# Patient Record
Sex: FEMALE | Race: WHITE
[De-identification: names, ages, dates, MRNs, and addresses within clinical notes are randomized per-mention and may not be internally consistent; named-entity substitution may affect disease eponyms.]

---

## 2017-09-05 ENCOUNTER — HOSPITAL ENCOUNTER (EMERGENCY)
Dept: HOSPITAL 23 - SED | Age: 27
Discharge: HOME | End: 2017-09-05
Payer: COMMERCIAL

## 2017-09-05 VITALS — BODY MASS INDEX: 32.61 KG/M2 | WEIGHT: 190.99 LBS | HEIGHT: 64 IN

## 2017-09-05 DIAGNOSIS — Z88.8: ICD-10-CM

## 2017-09-05 DIAGNOSIS — J45.909: ICD-10-CM

## 2017-09-05 DIAGNOSIS — T81.4XXA: Primary | ICD-10-CM

## 2017-09-05 DIAGNOSIS — F41.9: ICD-10-CM

## 2017-09-05 DIAGNOSIS — N72: ICD-10-CM

## 2017-09-05 LAB
BASOPHIL#: 0.1 X10E3 (ref 0–0.3)
BASOPHIL%: 0.8 % (ref 0–2.5)
BLOOD UREA NITROGEN: 15 MG/DL (ref 9–23)
BUN/CREATININE RATIO: 25
CALCIUM SERUM: 9.1 MG/DL (ref 8.4–10.2)
CK MB SERPL-RTO: 13.3 % (ref 11–15.5)
CK MB SERPL-RTO: 33.7 G/DL (ref 30–36)
CREATININE SERUM: 0.6 MG/DL (ref 0.6–1.4)
DIFF IND: NO
EOSINOPHIL#: 0.1 X10E3 (ref 0–0.7)
EOSINOPHIL%: 2.1 % (ref 0–7)
GLOM FILT RATE ESTIMATED: 124 ML/MIN (ref 60–?)
GLUCOSE FASTING: 91 MG/DL (ref 70–110)
HEMATOCRIT: 41 % (ref 35–45)
HEMOGLOBIN: 13.8 GM/DL (ref 12–16)
KETONES UR QL: 107 MMOL/L (ref 100–111)
KETONES UR QL: 24 MMOL/L (ref 22–31)
LYMPHOCYTE#: 2.5 X10E3 (ref 1–3.5)
LYMPHOCYTE%: 35.4 % (ref 17–45)
MEAN CELL VOLUME: 92.6 FL (ref 83–96)
MEAN CORPUSCULAR HEMOGLOBIN: 31.3 PG (ref 28–34)
MEAN PLATELET VOLUME: 11.1 FL (ref 6.5–11.5)
MICRO INDICATED?: NO
MONOCYTE#: 0.6 X10E3 (ref 0–1)
MONOCYTE%: 8.9 % (ref 3–12)
NEUTROPHIL#: 3.8 X10E3 (ref 1.5–7.1)
NEUTROPHIL%: 52.8 % (ref 40–75)
PLATELET COUNT: 156 X10E3 (ref 140–420)
POTASSIUM: 3.9 MMOL/L (ref 3.5–5.1)
RED BLOOD COUNT: 4.42 X10E (ref 3.9–5.3)
SODIUM: 138 MMOL/L (ref 135–145)
URINE APPEARANCE: CLEAR
URINE BILIRUBIN: (no result)
URINE BLOOD: (no result)
URINE COLOR: YELLOW
URINE GLUCOSE: (no result) MG/DL
URINE KETONE: (no result)
URINE LEUKOCYTE ESTERASE: (no result)
URINE NITRATE: (no result)
URINE PH: 7 (ref 5–8)
URINE PROTEIN: (no result)
URINE SOURCE: (no result)
URINE SPECIFIC GRAVITY: 1.01 (ref 1–1.03)
URINE UROBILINOGEN: 0.2 MG/DL
WHITE BLOOD COUNT: 7.1 X10E3 (ref 4–10.5)

## 2024-10-18 ENCOUNTER — APPOINTMENT (OUTPATIENT)
Dept: GENERAL RADIOLOGY | Facility: HOSPITAL | Age: 34
End: 2024-10-18
Payer: COMMERCIAL

## 2024-10-18 ENCOUNTER — HOSPITAL ENCOUNTER (EMERGENCY)
Facility: HOSPITAL | Age: 34
Discharge: HOME | End: 2024-10-19
Attending: EMERGENCY MEDICINE
Payer: COMMERCIAL

## 2024-10-18 VITALS
HEIGHT: 64 IN | OXYGEN SATURATION: 100 % | RESPIRATION RATE: 16 BRPM | DIASTOLIC BLOOD PRESSURE: 102 MMHG | SYSTOLIC BLOOD PRESSURE: 136 MMHG | WEIGHT: 235 LBS | TEMPERATURE: 98.6 F | BODY MASS INDEX: 40.12 KG/M2 | HEART RATE: 76 BPM

## 2024-10-18 DIAGNOSIS — M75.22 BICEPS TENDINITIS OF LEFT UPPER EXTREMITY: Primary | ICD-10-CM

## 2024-10-18 PROCEDURE — 72050 X-RAY EXAM NECK SPINE 4/5VWS: CPT

## 2024-10-18 PROCEDURE — 99283 EMERGENCY DEPT VISIT LOW MDM: CPT

## 2024-10-18 PROCEDURE — 73030 X-RAY EXAM OF SHOULDER: CPT

## 2024-10-18 PROCEDURE — 25010000002 KETOROLAC TROMETHAMINE PER 15 MG: Performed by: EMERGENCY MEDICINE

## 2024-10-18 PROCEDURE — 96372 THER/PROPH/DIAG INJ SC/IM: CPT

## 2024-10-18 PROCEDURE — 93005 ELECTROCARDIOGRAM TRACING: CPT | Performed by: EMERGENCY MEDICINE

## 2024-10-18 RX ORDER — IBUPROFEN 400 MG/1
800 TABLET, FILM COATED ORAL ONCE
Status: DISCONTINUED | OUTPATIENT
Start: 2024-10-18 | End: 2024-10-18

## 2024-10-18 RX ORDER — KETOROLAC TROMETHAMINE 30 MG/ML
60 INJECTION, SOLUTION INTRAMUSCULAR; INTRAVENOUS ONCE
Status: COMPLETED | OUTPATIENT
Start: 2024-10-18 | End: 2024-10-18

## 2024-10-18 RX ADMIN — KETOROLAC TROMETHAMINE 60 MG: 30 INJECTION, SOLUTION INTRAMUSCULAR; INTRAVENOUS at 23:30

## 2024-10-19 LAB
QT INTERVAL: 376 MS
QTC INTERVAL: 429 MS

## 2024-10-19 PROCEDURE — 99283 EMERGENCY DEPT VISIT LOW MDM: CPT | Performed by: EMERGENCY MEDICINE

## 2024-10-19 NOTE — FSED PROVIDER NOTE
"Subjective   History of Present Illness  35yo female presents ED c/o 2wk hx intermittent left shoulder/proximal arm pain characterized as \"sharp\"/exacerbated movement/occasional radiation left hand.  ROS neg neck pain/trauma/chest pain/soa/paresthesia/motor weakness.    History provided by:  Patient  Shoulder Injury      Review of Systems   Constitutional: Negative.    HENT: Negative.     Eyes: Negative.    Respiratory: Negative.     Cardiovascular: Negative.    Gastrointestinal: Negative.    Musculoskeletal:  Positive for arthralgias.   Allergic/Immunologic: Negative for immunocompromised state.   Neurological:  Negative for weakness and numbness.   All other systems reviewed and are negative.      Past Medical History:   Diagnosis Date    Anxiety     Asthma     Depression     GERD (gastroesophageal reflux disease)     Hidradenitis        Allergies   Allergen Reactions    Lorazepam Anxiety     pt states makes me feel more depressedstates emotional reaction    pt states it makes her more depressed feeling    Tilactase Swelling    Zolpidem Unknown - High Severity and Headache     Makes me extremely depressed.     Outside Source Comment: Makes me extremely depressed.    Makes me extremely depressed.    Bee Venom Unknown - High Severity    Doxycycline Unknown - High Severity     pt states she told her doctor she did not want to take, that her mother won't take due to pseudotumors and he listed it as an allergystates emotional reaction    Lactose Other (See Comments) and Unknown (See Comments)    Prednisone Anxiety       History reviewed. No pertinent surgical history.    History reviewed. No pertinent family history.    Social History     Socioeconomic History    Marital status: Single   Tobacco Use    Smoking status: Never    Smokeless tobacco: Never   Vaping Use    Vaping status: Never Used   Substance and Sexual Activity    Alcohol use: Never           Objective   Physical Exam  Vitals and nursing note reviewed. "   Constitutional:       Appearance: Normal appearance.   HENT:      Head: Normocephalic and atraumatic.      Right Ear: External ear normal.      Left Ear: External ear normal.      Nose: Nose normal.      Mouth/Throat:      Mouth: Mucous membranes are moist.      Pharynx: Oropharynx is clear.   Eyes:      Pupils: Pupils are equal, round, and reactive to light.   Cardiovascular:      Rate and Rhythm: Normal rate and regular rhythm.      Pulses: Normal pulses.      Heart sounds: Normal heart sounds. No murmur heard.     No friction rub. No gallop.   Pulmonary:      Effort: Pulmonary effort is normal. No respiratory distress.      Breath sounds: Normal breath sounds. No wheezing, rhonchi or rales.   Abdominal:      General: Bowel sounds are normal. There is no distension.      Palpations: Abdomen is soft.      Tenderness: There is no abdominal tenderness.   Musculoskeletal:      Left shoulder: Tenderness present. No swelling, deformity or bony tenderness. Normal range of motion. Normal strength. Normal pulse.      Left upper arm: Normal.      Left elbow: Normal.        Arms:       Cervical back: Normal, full passive range of motion without pain, normal range of motion and neck supple. No rigidity or tenderness. No spinous process tenderness or muscular tenderness.   Lymphadenopathy:      Cervical: No cervical adenopathy.   Skin:     General: Skin is warm and dry.   Neurological:      General: No focal deficit present.      Mental Status: She is alert and oriented to person, place, and time.      GCS: GCS eye subscore is 4. GCS verbal subscore is 5. GCS motor subscore is 6.      Sensory: Sensation is intact.      Motor: Motor function is intact.      Deep Tendon Reflexes:      Reflex Scores:       Bicep reflexes are 2+ on the left side.        ECG 12 Lead      Date/Time: 10/19/2024 12:37 AM    Performed by: Jefferson Castro MD  Authorized by: Jefferson Castro MD  Interpreted by ED physician  Rhythm: sinus rhythm  Rate:  normal  BPM: 78  QRS axis: normal  Conduction: conduction normal  ST Segments: ST segments normal  T Waves: T waves normal  Other findings: PRWP  Clinical impression: non-specific ECG               ED Course      XR Spine Cervical Complete 4 or 5 View    Result Date: 10/19/2024  Narrative: Patient: FAVIAN KAY  Time Out: 00:23 Exam(s): XR C SPINE EXAM:   XR Cervical Spine, 7 Views CLINICAL HISTORY:    Reason for exam: shoulder pain. TECHNIQUE:   7 views of the cervical spine. COMPARISON:   No relevant prior studies available. FINDINGS:   Vertebrae:  Unremarkable.  No definite fracture.  Normal alignment.   Disc spaces:  No acute findings.  No neuroforaminal narrowing.   Soft tissues:  Unremarkable.  No prevertebral soft tissue swelling. IMPRESSION:     Unremarkable cervical spine x-rays.     Impression: Electronically signed by Pb Aguilar MD on 10-19-24 at 0023    XR Shoulder 2+ View Left    Result Date: 10/19/2024  Narrative: Patient: FAVIAN KAY  Time Out: 00:22 Exam(s): XR LEFT SHOULDER EXAM:   XR Left Shoulder Complete, 2 or More Views CLINICAL HISTORY:    Reason for exam: shoulder pain. TECHNIQUE:   Two or more views of the left shoulder. COMPARISON:   No relevant prior studies available. FINDINGS:   Bones joints:  Unremarkable.  No acute fracture.  No dislocation.   Soft tissues:  Unremarkable. IMPRESSION:     Unremarkable left shoulder x-rays.     Impression: Electronically signed by Pb Aguilar MD on 10-19-24 at 0022    Imaging Scanned Result    Result Date: 9/24/2024  Narrative: This result has an attachment that is not available. Ordered by an unspecified provider.                                        Medical Decision Making      Final diagnoses:   Biceps tendinitis of left upper extremity       ED Disposition  ED Disposition       ED Disposition   Discharge    Condition   Good    Comment   --               Ning Silva MD  [address] [place] [id number]  [phone]    Schedule an  appointment as soon as possible for a visit       Hi Guzman MD  [address] [facility] [address]  [place] [id number] [phone]    In 3 days           Medication List        New Prescriptions      diclofenac 50 MG EC tablet  Commonly known as: VOLTAREN  Take 1 tablet by mouth 3 (Three) Times a Day As Needed (pain).               Where to Get Your Medications        These medications [id number] - [place], [place] - [address] [facility] AT [facility] - [phone]  - [phone] [address], [facility] [address]      Phone: [phone]   diclofenac 50 MG EC tablet

## 2025-02-18 ENCOUNTER — TELEPHONE (OUTPATIENT)
Dept: URGENT CARE | Facility: CLINIC | Age: 35
End: 2025-02-18
Payer: COMMERCIAL

## 2025-02-18 DIAGNOSIS — J02.0 STREP THROAT: Primary | ICD-10-CM

## 2025-02-18 RX ORDER — AMOXICILLIN 500 MG/1
1000 CAPSULE ORAL 2 TIMES DAILY
Qty: 40 CAPSULE | Refills: 0 | Status: SHIPPED | OUTPATIENT
Start: 2025-02-18 | End: 2025-02-28

## 2025-05-07 ENCOUNTER — HOSPITAL ENCOUNTER (OUTPATIENT)
Facility: HOSPITAL | Age: 35
Setting detail: OBSERVATION
Discharge: HOME | End: 2025-05-08
Attending: EMERGENCY MEDICINE | Admitting: EMERGENCY MEDICINE
Payer: COMMERCIAL

## 2025-05-07 ENCOUNTER — APPOINTMENT (OUTPATIENT)
Dept: GENERAL RADIOLOGY | Facility: HOSPITAL | Age: 35
End: 2025-05-07
Payer: COMMERCIAL

## 2025-05-07 DIAGNOSIS — R00.2 PALPITATIONS: ICD-10-CM

## 2025-05-07 DIAGNOSIS — R07.89 ATYPICAL CHEST PAIN: ICD-10-CM

## 2025-05-07 DIAGNOSIS — R42 DIZZINESS AND GIDDINESS: ICD-10-CM

## 2025-05-07 DIAGNOSIS — R42 EPISODIC LIGHTHEADEDNESS: Primary | ICD-10-CM

## 2025-05-07 PROCEDURE — 99285 EMERGENCY DEPT VISIT HI MDM: CPT

## 2025-05-07 PROCEDURE — 84439 ASSAY OF FREE THYROXINE: CPT | Performed by: PHYSICIAN ASSISTANT

## 2025-05-07 PROCEDURE — 93005 ELECTROCARDIOGRAM TRACING: CPT

## 2025-05-07 PROCEDURE — 93005 ELECTROCARDIOGRAM TRACING: CPT | Performed by: EMERGENCY MEDICINE

## 2025-05-07 PROCEDURE — 84443 ASSAY THYROID STIM HORMONE: CPT | Performed by: PHYSICIAN ASSISTANT

## 2025-05-07 PROCEDURE — 84484 ASSAY OF TROPONIN QUANT: CPT | Performed by: EMERGENCY MEDICINE

## 2025-05-07 PROCEDURE — 85379 FIBRIN DEGRADATION QUANT: CPT | Performed by: EMERGENCY MEDICINE

## 2025-05-07 PROCEDURE — 71045 X-RAY EXAM CHEST 1 VIEW: CPT

## 2025-05-07 PROCEDURE — 80053 COMPREHEN METABOLIC PANEL: CPT | Performed by: EMERGENCY MEDICINE

## 2025-05-07 RX ORDER — SODIUM CHLORIDE 0.9 % (FLUSH) 0.9 %
10 SYRINGE (ML) INJECTION AS NEEDED
Status: DISCONTINUED | OUTPATIENT
Start: 2025-05-07 | End: 2025-05-08 | Stop reason: HOSPADM

## 2025-05-07 RX ORDER — ASPIRIN 81 MG/1
324 TABLET, CHEWABLE ORAL ONCE
Status: COMPLETED | OUTPATIENT
Start: 2025-05-07 | End: 2025-05-08

## 2025-05-08 ENCOUNTER — APPOINTMENT (OUTPATIENT)
Dept: CARDIOLOGY | Facility: HOSPITAL | Age: 35
End: 2025-05-08
Payer: COMMERCIAL

## 2025-05-08 ENCOUNTER — APPOINTMENT (OUTPATIENT)
Dept: CT IMAGING | Facility: HOSPITAL | Age: 35
End: 2025-05-08
Payer: COMMERCIAL

## 2025-05-08 ENCOUNTER — APPOINTMENT (OUTPATIENT)
Dept: MRI IMAGING | Facility: HOSPITAL | Age: 35
End: 2025-05-08
Payer: COMMERCIAL

## 2025-05-08 VITALS
HEART RATE: 74 BPM | DIASTOLIC BLOOD PRESSURE: 74 MMHG | WEIGHT: 250 LBS | TEMPERATURE: 97.9 F | HEIGHT: 64 IN | BODY MASS INDEX: 42.68 KG/M2 | RESPIRATION RATE: 16 BRPM | SYSTOLIC BLOOD PRESSURE: 115 MMHG | OXYGEN SATURATION: 98 %

## 2025-05-08 PROBLEM — R42 LIGHTHEADEDNESS: Status: ACTIVE | Noted: 2025-05-08

## 2025-05-08 LAB
ALBUMIN SERPL-MCNC: 4.1 G/DL (ref 3.5–5.2)
ALBUMIN/GLOB SERPL: 1.5 G/DL
ALP SERPL-CCNC: 107 U/L (ref 39–117)
ALT SERPL W P-5'-P-CCNC: 22 U/L (ref 1–33)
ANION GAP SERPL CALCULATED.3IONS-SCNC: 11.3 MMOL/L (ref 5–15)
ANION GAP SERPL CALCULATED.3IONS-SCNC: 11.6 MMOL/L (ref 5–15)
AORTIC ARCH: 2.3 CM
AORTIC DIMENSIONLESS INDEX: 0.84 (DI)
ASCENDING AORTA: 2.7 CM
AST SERPL-CCNC: 22 U/L (ref 1–32)
AV MEAN PRESS GRAD SYS DOP V1V2: 3 MMHG
AV VMAX SYS DOP: 110 CM/SEC
B PARAPERT DNA SPEC QL NAA+PROBE: NOT DETECTED
B PERT DNA SPEC QL NAA+PROBE: NOT DETECTED
BASOPHILS # BLD AUTO: 0.05 10*3/MM3 (ref 0–0.2)
BASOPHILS # BLD MANUAL: 0.07 10*3/MM3 (ref 0–0.2)
BASOPHILS NFR BLD AUTO: 0.7 % (ref 0–1.5)
BASOPHILS NFR BLD MANUAL: 1 % (ref 0–1.5)
BH CV ECHO MEAS - AO MAX PG: 4.8 MMHG
BH CV ECHO MEAS - AO V2 VTI: 25.7 CM
BH CV ECHO MEAS - AVA(I,D): 2.7 CM2
BH CV ECHO MEAS - EDV(CUBED): 118.1 ML
BH CV ECHO MEAS - EDV(MOD-SP2): 100 ML
BH CV ECHO MEAS - EDV(MOD-SP4): 93 ML
BH CV ECHO MEAS - EF(MOD-SP2): 63 %
BH CV ECHO MEAS - EF(MOD-SP4): 62.4 %
BH CV ECHO MEAS - ESV(CUBED): 30.3 ML
BH CV ECHO MEAS - ESV(MOD-SP2): 37 ML
BH CV ECHO MEAS - ESV(MOD-SP4): 35 ML
BH CV ECHO MEAS - FS: 36.4 %
BH CV ECHO MEAS - IVS/LVPW: 0.93 CM
BH CV ECHO MEAS - IVSD: 0.8 CM
BH CV ECHO MEAS - LAT PEAK E' VEL: 13.7 CM/SEC
BH CV ECHO MEAS - LV DIASTOLIC VOL/BSA (35-75): 43.2 CM2
BH CV ECHO MEAS - LV MASS(C)D: 137.6 GRAMS
BH CV ECHO MEAS - LV MAX PG: 4 MMHG
BH CV ECHO MEAS - LV MEAN PG: 1.77 MMHG
BH CV ECHO MEAS - LV SYSTOLIC VOL/BSA (12-30): 16.3 CM2
BH CV ECHO MEAS - LV V1 MAX: 100.1 CM/SEC
BH CV ECHO MEAS - LV V1 VTI: 21.6 CM
BH CV ECHO MEAS - LVIDD: 4.9 CM
BH CV ECHO MEAS - LVIDS: 3.1 CM
BH CV ECHO MEAS - LVOT AREA: 3.2 CM2
BH CV ECHO MEAS - LVOT DIAM: 2.02 CM
BH CV ECHO MEAS - LVPWD: 0.86 CM
BH CV ECHO MEAS - MED PEAK E' VEL: 10.7 CM/SEC
BH CV ECHO MEAS - MV A DUR: 0.11 SEC
BH CV ECHO MEAS - MV A MAX VEL: 75.5 CM/SEC
BH CV ECHO MEAS - MV DEC SLOPE: 628.4 CM/SEC2
BH CV ECHO MEAS - MV DEC TIME: 0.21 SEC
BH CV ECHO MEAS - MV E MAX VEL: 109 CM/SEC
BH CV ECHO MEAS - MV E/A: 1.44
BH CV ECHO MEAS - MV MAX PG: 6.2 MMHG
BH CV ECHO MEAS - MV MEAN PG: 2.02 MMHG
BH CV ECHO MEAS - MV P1/2T: 58.3 MSEC
BH CV ECHO MEAS - MV V2 VTI: 31.9 CM
BH CV ECHO MEAS - MVA(P1/2T): 3.8 CM2
BH CV ECHO MEAS - MVA(VTI): 2.17 CM2
BH CV ECHO MEAS - PA ACC TIME: 0.14 SEC
BH CV ECHO MEAS - PA V2 MAX: 91.2 CM/SEC
BH CV ECHO MEAS - PI END-D VEL: 91.2 CM/SEC
BH CV ECHO MEAS - PULM A REVS DUR: 0.08 SEC
BH CV ECHO MEAS - PULM A REVS VEL: 27.2 CM/SEC
BH CV ECHO MEAS - PULM DIAS VEL: 34.1 CM/SEC
BH CV ECHO MEAS - PULM S/D: 1.09
BH CV ECHO MEAS - PULM SYS VEL: 37.1 CM/SEC
BH CV ECHO MEAS - RV MAX PG: 3.2 MMHG
BH CV ECHO MEAS - RV V1 MAX: 89.7 CM/SEC
BH CV ECHO MEAS - RV V1 VTI: 18.6 CM
BH CV ECHO MEAS - SV(LVOT): 69.1 ML
BH CV ECHO MEAS - SV(MOD-SP2): 63 ML
BH CV ECHO MEAS - SV(MOD-SP4): 58 ML
BH CV ECHO MEAS - SVI(LVOT): 32.1 ML/M2
BH CV ECHO MEAS - SVI(MOD-SP2): 29.3 ML/M2
BH CV ECHO MEAS - SVI(MOD-SP4): 27 ML/M2
BH CV ECHO MEAS - TAPSE (>1.6): 1.87 CM
BH CV ECHO MEASUREMENTS AVERAGE E/E' RATIO: 8.93
BH CV STRESS BP STAGE 1: NORMAL
BH CV STRESS BP STAGE 2: NORMAL
BH CV STRESS BP STAGE 3: NORMAL
BH CV STRESS DURATION MIN STAGE 1: 3
BH CV STRESS DURATION MIN STAGE 2: 3
BH CV STRESS DURATION MIN STAGE 3: 0
BH CV STRESS DURATION SEC STAGE 1: 0
BH CV STRESS DURATION SEC STAGE 2: 0
BH CV STRESS DURATION SEC STAGE 3: 53
BH CV STRESS GRADE STAGE 1: 10
BH CV STRESS GRADE STAGE 2: 12
BH CV STRESS GRADE STAGE 3: 14
BH CV STRESS HR STAGE 1: 106
BH CV STRESS HR STAGE 2: 133
BH CV STRESS HR STAGE 3: 156
BH CV STRESS METS STAGE 1: 5
BH CV STRESS METS STAGE 2: 7.5
BH CV STRESS METS STAGE 3: 10
BH CV STRESS PROTOCOL 1: NORMAL
BH CV STRESS RECOVERY BP: NORMAL MMHG
BH CV STRESS RECOVERY HR: 90 BPM
BH CV STRESS SPEED STAGE 1: 1.7
BH CV STRESS SPEED STAGE 2: 2.5
BH CV STRESS SPEED STAGE 3: 3.4
BH CV STRESS STAGE 1: 1
BH CV STRESS STAGE 2: 2
BH CV STRESS STAGE 3: 3
BH CV XLRA - TDI S': 10.2 CM/SEC
BILIRUB SERPL-MCNC: 0.2 MG/DL (ref 0–1.2)
BUN SERPL-MCNC: 10 MG/DL (ref 6–20)
BUN SERPL-MCNC: 11 MG/DL (ref 6–20)
BUN/CREAT SERPL: 15.1 (ref 7–25)
BUN/CREAT SERPL: 16.1 (ref 7–25)
C PNEUM DNA NPH QL NAA+NON-PROBE: NOT DETECTED
CALCIUM SPEC-SCNC: 8.7 MG/DL (ref 8.6–10.5)
CALCIUM SPEC-SCNC: 9 MG/DL (ref 8.6–10.5)
CHLORIDE SERPL-SCNC: 106 MMOL/L (ref 98–107)
CHLORIDE SERPL-SCNC: 109 MMOL/L (ref 98–107)
CHOLEST SERPL-MCNC: 157 MG/DL (ref 0–200)
CO2 SERPL-SCNC: 19.4 MMOL/L (ref 22–29)
CO2 SERPL-SCNC: 22.7 MMOL/L (ref 22–29)
CREAT SERPL-MCNC: 0.62 MG/DL (ref 0.57–1)
CREAT SERPL-MCNC: 0.73 MG/DL (ref 0.57–1)
D DIMER PPP FEU-MCNC: <0.27 MCGFEU/ML (ref 0–0.5)
DEPRECATED RDW RBC AUTO: 42 FL (ref 37–54)
DEPRECATED RDW RBC AUTO: 42.7 FL (ref 37–54)
EGFRCR SERPLBLD CKD-EPI 2021: 109.5 ML/MIN/1.73
EGFRCR SERPLBLD CKD-EPI 2021: 118.5 ML/MIN/1.73
EOSINOPHIL # BLD AUTO: 0.22 10*3/MM3 (ref 0–0.4)
EOSINOPHIL # BLD MANUAL: 0.22 10*3/MM3 (ref 0–0.4)
EOSINOPHIL NFR BLD AUTO: 3 % (ref 0.3–6.2)
EOSINOPHIL NFR BLD MANUAL: 3.1 % (ref 0.3–6.2)
ERYTHROCYTE [DISTWIDTH] IN BLOOD BY AUTOMATED COUNT: 12.2 % (ref 12.3–15.4)
ERYTHROCYTE [DISTWIDTH] IN BLOOD BY AUTOMATED COUNT: 12.6 % (ref 12.3–15.4)
FLUAV SUBTYP SPEC NAA+PROBE: NOT DETECTED
FLUBV RNA ISLT QL NAA+PROBE: NOT DETECTED
GEN 5 1HR TROPONIN T REFLEX: <6 NG/L
GLOBULIN UR ELPH-MCNC: 2.8 GM/DL
GLUCOSE SERPL-MCNC: 115 MG/DL (ref 65–99)
GLUCOSE SERPL-MCNC: 88 MG/DL (ref 65–99)
HADV DNA SPEC NAA+PROBE: NOT DETECTED
HCOV 229E RNA SPEC QL NAA+PROBE: NOT DETECTED
HCOV HKU1 RNA SPEC QL NAA+PROBE: NOT DETECTED
HCOV NL63 RNA SPEC QL NAA+PROBE: NOT DETECTED
HCOV OC43 RNA SPEC QL NAA+PROBE: NOT DETECTED
HCT VFR BLD AUTO: 40.4 % (ref 34–46.6)
HCT VFR BLD AUTO: 41.1 % (ref 34–46.6)
HDLC SERPL-MCNC: 55 MG/DL (ref 40–60)
HGB BLD-MCNC: 13.2 G/DL (ref 12–15.9)
HGB BLD-MCNC: 13.5 G/DL (ref 12–15.9)
HMPV RNA NPH QL NAA+NON-PROBE: NOT DETECTED
HOLD SPECIMEN: NORMAL
HOLD SPECIMEN: NORMAL
HPIV1 RNA ISLT QL NAA+PROBE: NOT DETECTED
HPIV2 RNA SPEC QL NAA+PROBE: NOT DETECTED
HPIV3 RNA NPH QL NAA+PROBE: NOT DETECTED
HPIV4 P GENE NPH QL NAA+PROBE: NOT DETECTED
IMM GRANULOCYTES # BLD AUTO: 0.02 10*3/MM3 (ref 0–0.05)
IMM GRANULOCYTES NFR BLD AUTO: 0.3 % (ref 0–0.5)
LDLC SERPL CALC-MCNC: 86 MG/DL (ref 0–100)
LDLC/HDLC SERPL: 1.56 {RATIO}
LEFT ATRIUM VOLUME INDEX: 14.7 ML/M2
LV EF BIPLANE MOD: 62.7 %
LYMPHOCYTES # BLD AUTO: 2.24 10*3/MM3 (ref 0.7–3.1)
LYMPHOCYTES # BLD MANUAL: 2.44 10*3/MM3 (ref 0.7–3.1)
LYMPHOCYTES NFR BLD AUTO: 30.1 % (ref 19.6–45.3)
LYMPHOCYTES NFR BLD MANUAL: 5.1 % (ref 5–12)
M PNEUMO IGG SER IA-ACNC: NOT DETECTED
MAXIMAL PREDICTED HEART RATE: 184 BPM
MCH RBC QN AUTO: 30.5 PG (ref 26.6–33)
MCH RBC QN AUTO: 30.5 PG (ref 26.6–33)
MCHC RBC AUTO-ENTMCNC: 32.7 G/DL (ref 31.5–35.7)
MCHC RBC AUTO-ENTMCNC: 32.8 G/DL (ref 31.5–35.7)
MCV RBC AUTO: 93 FL (ref 79–97)
MCV RBC AUTO: 93.3 FL (ref 79–97)
MONOCYTES # BLD AUTO: 0.76 10*3/MM3 (ref 0.1–0.9)
MONOCYTES # BLD: 0.37 10*3/MM3 (ref 0.1–0.9)
MONOCYTES NFR BLD AUTO: 10.2 % (ref 5–12)
NEUTROPHILS # BLD AUTO: 4.13 10*3/MM3 (ref 1.7–7)
NEUTROPHILS NFR BLD AUTO: 4.15 10*3/MM3 (ref 1.7–7)
NEUTROPHILS NFR BLD AUTO: 55.7 % (ref 42.7–76)
NEUTROPHILS NFR BLD MANUAL: 57.1 % (ref 42.7–76)
NRBC BLD AUTO-RTO: 0 /100 WBC (ref 0–0.2)
PERCENT MAX PREDICTED HR: 84.78 %
PLAT MORPH BLD: NORMAL
PLATELET # BLD AUTO: 218 10*3/MM3 (ref 140–450)
PLATELET # BLD AUTO: 219 10*3/MM3 (ref 140–450)
PMV BLD AUTO: 11.9 FL (ref 6–12)
PMV BLD AUTO: 11.9 FL (ref 6–12)
POTASSIUM SERPL-SCNC: 3.8 MMOL/L (ref 3.5–5.2)
POTASSIUM SERPL-SCNC: 3.8 MMOL/L (ref 3.5–5.2)
PROT SERPL-MCNC: 6.9 G/DL (ref 6–8.5)
QT INTERVAL: 370 MS
QTC INTERVAL: 444 MS
RBC # BLD AUTO: 4.33 10*6/MM3 (ref 3.77–5.28)
RBC # BLD AUTO: 4.42 10*6/MM3 (ref 3.77–5.28)
RBC MORPH BLD: NORMAL
RHINOVIRUS RNA SPEC NAA+PROBE: NOT DETECTED
RSV RNA NPH QL NAA+NON-PROBE: NOT DETECTED
SARS-COV-2 RNA NPH QL NAA+NON-PROBE: NOT DETECTED
SINUS: 2.9 CM
SODIUM SERPL-SCNC: 140 MMOL/L (ref 136–145)
SODIUM SERPL-SCNC: 140 MMOL/L (ref 136–145)
STJ: 2.5 CM
STRESS BASELINE BP: NORMAL MMHG
STRESS BASELINE HR: 71 BPM
STRESS PERCENT HR: 100 %
STRESS POST ESTIMATED WORKLOAD: 8.4 METS
STRESS POST EXERCISE DUR MIN: 6 MIN
STRESS POST EXERCISE DUR SEC: 53 SEC
STRESS POST PEAK BP: NORMAL MMHG
STRESS POST PEAK HR: 156 BPM
STRESS TARGET HR: 156 BPM
T4 FREE SERPL-MCNC: 1.05 NG/DL (ref 0.92–1.68)
TRIGL SERPL-MCNC: 82 MG/DL (ref 0–150)
TROPONIN T NUMERIC DELTA: NORMAL
TROPONIN T SERPL HS-MCNC: <6 NG/L
TROPONIN T SERPL HS-MCNC: <6 NG/L
TSH SERPL DL<=0.05 MIU/L-ACNC: 3.54 UIU/ML (ref 0.27–4.2)
VARIANT LYMPHS NFR BLD MANUAL: 33.7 % (ref 19.6–45.3)
VLDLC SERPL-MCNC: 16 MG/DL (ref 5–40)
WBC MORPH BLD: NORMAL
WBC NRBC COR # BLD AUTO: 7.23 10*3/MM3 (ref 3.4–10.8)
WBC NRBC COR # BLD AUTO: 7.44 10*3/MM3 (ref 3.4–10.8)
WHOLE BLOOD HOLD COAG: NORMAL
WHOLE BLOOD HOLD SPECIMEN: NORMAL

## 2025-05-08 PROCEDURE — 25510000001 PERFLUTREN 6.52 MG/ML SUSPENSION 2 ML VIAL: Performed by: INTERNAL MEDICINE

## 2025-05-08 PROCEDURE — 93246 EXT ECG>7D<15D RECORDING: CPT

## 2025-05-08 PROCEDURE — 36415 COLL VENOUS BLD VENIPUNCTURE: CPT

## 2025-05-08 PROCEDURE — 85025 COMPLETE CBC W/AUTO DIFF WBC: CPT | Performed by: EMERGENCY MEDICINE

## 2025-05-08 PROCEDURE — 85007 BL SMEAR W/DIFF WBC COUNT: CPT | Performed by: PHYSICIAN ASSISTANT

## 2025-05-08 PROCEDURE — 25510000001 IOPAMIDOL PER 1 ML: Performed by: EMERGENCY MEDICINE

## 2025-05-08 PROCEDURE — 70551 MRI BRAIN STEM W/O DYE: CPT

## 2025-05-08 PROCEDURE — 80048 BASIC METABOLIC PNL TOTAL CA: CPT | Performed by: PHYSICIAN ASSISTANT

## 2025-05-08 PROCEDURE — 25810000003 SODIUM CHLORIDE 0.9 % SOLUTION: Performed by: EMERGENCY MEDICINE

## 2025-05-08 PROCEDURE — 85027 COMPLETE CBC AUTOMATED: CPT | Performed by: PHYSICIAN ASSISTANT

## 2025-05-08 PROCEDURE — 93017 CV STRESS TEST TRACING ONLY: CPT

## 2025-05-08 PROCEDURE — G0378 HOSPITAL OBSERVATION PER HR: HCPCS

## 2025-05-08 PROCEDURE — 93306 TTE W/DOPPLER COMPLETE: CPT | Performed by: INTERNAL MEDICINE

## 2025-05-08 PROCEDURE — 84484 ASSAY OF TROPONIN QUANT: CPT | Performed by: EMERGENCY MEDICINE

## 2025-05-08 PROCEDURE — 80061 LIPID PANEL: CPT | Performed by: PHYSICIAN ASSISTANT

## 2025-05-08 PROCEDURE — 71275 CT ANGIOGRAPHY CHEST: CPT

## 2025-05-08 PROCEDURE — 93306 TTE W/DOPPLER COMPLETE: CPT

## 2025-05-08 PROCEDURE — 0202U NFCT DS 22 TRGT SARS-COV-2: CPT | Performed by: PHYSICIAN ASSISTANT

## 2025-05-08 PROCEDURE — 84484 ASSAY OF TROPONIN QUANT: CPT | Performed by: PHYSICIAN ASSISTANT

## 2025-05-08 PROCEDURE — 93018 CV STRESS TEST I&R ONLY: CPT | Performed by: INTERNAL MEDICINE

## 2025-05-08 RX ORDER — SODIUM CHLORIDE 0.9 % (FLUSH) 0.9 %
10 SYRINGE (ML) INJECTION EVERY 12 HOURS SCHEDULED
Status: DISCONTINUED | OUTPATIENT
Start: 2025-05-08 | End: 2025-05-08 | Stop reason: HOSPADM

## 2025-05-08 RX ORDER — BISACODYL 5 MG/1
5 TABLET, DELAYED RELEASE ORAL DAILY PRN
Status: DISCONTINUED | OUTPATIENT
Start: 2025-05-08 | End: 2025-05-08 | Stop reason: HOSPADM

## 2025-05-08 RX ORDER — POLYETHYLENE GLYCOL 3350 17 G/17G
17 POWDER, FOR SOLUTION ORAL DAILY PRN
Status: DISCONTINUED | OUTPATIENT
Start: 2025-05-08 | End: 2025-05-08 | Stop reason: HOSPADM

## 2025-05-08 RX ORDER — HYDROXYZINE HYDROCHLORIDE 25 MG/1
25 TABLET, FILM COATED ORAL 3 TIMES DAILY PRN
Status: DISCONTINUED | OUTPATIENT
Start: 2025-05-08 | End: 2025-05-08 | Stop reason: HOSPADM

## 2025-05-08 RX ORDER — SUCRALFATE 1 G/1
1 TABLET ORAL 4 TIMES DAILY PRN
Status: DISCONTINUED | OUTPATIENT
Start: 2025-05-08 | End: 2025-05-08 | Stop reason: HOSPADM

## 2025-05-08 RX ORDER — SODIUM CHLORIDE 9 MG/ML
40 INJECTION, SOLUTION INTRAVENOUS AS NEEDED
Status: DISCONTINUED | OUTPATIENT
Start: 2025-05-08 | End: 2025-05-08 | Stop reason: HOSPADM

## 2025-05-08 RX ORDER — NITROGLYCERIN 0.4 MG/1
0.4 TABLET SUBLINGUAL
Status: DISCONTINUED | OUTPATIENT
Start: 2025-05-08 | End: 2025-05-08 | Stop reason: HOSPADM

## 2025-05-08 RX ORDER — ONDANSETRON 4 MG/1
4 TABLET, ORALLY DISINTEGRATING ORAL EVERY 6 HOURS PRN
Status: DISCONTINUED | OUTPATIENT
Start: 2025-05-08 | End: 2025-05-08 | Stop reason: HOSPADM

## 2025-05-08 RX ORDER — ACETAMINOPHEN 325 MG/1
650 TABLET ORAL EVERY 4 HOURS PRN
Status: DISCONTINUED | OUTPATIENT
Start: 2025-05-08 | End: 2025-05-08 | Stop reason: HOSPADM

## 2025-05-08 RX ORDER — SODIUM CHLORIDE 0.9 % (FLUSH) 0.9 %
10 SYRINGE (ML) INJECTION AS NEEDED
Status: DISCONTINUED | OUTPATIENT
Start: 2025-05-08 | End: 2025-05-08 | Stop reason: HOSPADM

## 2025-05-08 RX ORDER — IOPAMIDOL 755 MG/ML
100 INJECTION, SOLUTION INTRAVASCULAR
Status: COMPLETED | OUTPATIENT
Start: 2025-05-08 | End: 2025-05-08

## 2025-05-08 RX ORDER — DIAZEPAM 10 MG/2ML
2.5 INJECTION, SOLUTION INTRAMUSCULAR; INTRAVENOUS ONCE
Status: DISCONTINUED | OUTPATIENT
Start: 2025-05-08 | End: 2025-05-08 | Stop reason: HOSPADM

## 2025-05-08 RX ORDER — AMOXICILLIN 250 MG
2 CAPSULE ORAL 2 TIMES DAILY PRN
Status: DISCONTINUED | OUTPATIENT
Start: 2025-05-08 | End: 2025-05-08 | Stop reason: HOSPADM

## 2025-05-08 RX ORDER — BISACODYL 10 MG
10 SUPPOSITORY, RECTAL RECTAL DAILY PRN
Status: DISCONTINUED | OUTPATIENT
Start: 2025-05-08 | End: 2025-05-08 | Stop reason: HOSPADM

## 2025-05-08 RX ORDER — ONDANSETRON 2 MG/ML
4 INJECTION INTRAMUSCULAR; INTRAVENOUS EVERY 6 HOURS PRN
Status: DISCONTINUED | OUTPATIENT
Start: 2025-05-08 | End: 2025-05-08 | Stop reason: HOSPADM

## 2025-05-08 RX ADMIN — ASPIRIN 81 MG CHEWABLE TABLET 324 MG: 81 TABLET CHEWABLE at 00:49

## 2025-05-08 RX ADMIN — Medication 10 ML: at 03:25

## 2025-05-08 RX ADMIN — PERFLUTREN 2 ML: 6.52 INJECTION, SUSPENSION INTRAVENOUS at 10:47

## 2025-05-08 RX ADMIN — Medication 10 ML: at 08:12

## 2025-05-08 RX ADMIN — IOPAMIDOL 95 ML: 755 INJECTION, SOLUTION INTRAVENOUS at 00:41

## 2025-05-08 RX ADMIN — SODIUM CHLORIDE 500 ML: 9 INJECTION, SOLUTION INTRAVENOUS at 07:48

## 2025-05-08 NOTE — DISCHARGE SUMMARY
ED OBSERVATION PROGRESS/DISCHARGE SUMMARY    Date of Admission: 5/7/2025   LOS: 0 days   PCP: Ning Silva MD    Final Diagnosis lightheadedness      Subjective     Hospital Outcome: Aye Villegas is a 35 y.o. female comes in complaining of multiple complaints.  Patient states that she is not felt at baseline for several weeks.  Patient states that she feels that she has been having intermittent episodes of dizziness and lightheadedness.  Patient initially describes these episodes feeling like she is going to pass out feeling.  Patient states she will feel a pop in her chest when she takes a deep breath and and subsequently feels lightheaded feel like she is going to pass out.  Patient denies any episode of loss of consciousness or falls or injury.  Patient also reports a feeling of dizziness as well and was told that her doctor told her to get an MRI of her brain.  Patient was seen by her primary care provider today for follow-up of multiple ED visits for similar symptoms and patient is not sure what is causing her symptoms.  Patient does report that she is supposed to use a CPAP however she has not used it for several years.  Patient states she has a follow-up visit with the sleep clinic for this.  Patient states when she takes a deep breath even right now she will feel lightheaded.  Patient also states she has had to have her esophagus stretched and wonders if this is the issue as well however patient denies any  vomiting.  Patient states that she was seen last week at a different ER and was told that she was dehydrated and given IV fluids.  Patient also reports a chronic nonproductive cough for the past several months as well.     In the ED, CBC and CMP largely unremarkable for acute findings.  Initial troponin less than 6, repeat troponin less than 6, BNP normal.  hCG negative.  D-dimer negative.  Chest x-ray shows no acute findings.  CTA chest shows no acute findings.  No PE.  EKG shows sinus rhythm 86 bpm,  no ST elevation apparent.  Patient is afebrile, pulse in the 80s, on room air oxygen not on percent SpO2 and blood pressure normotensive.    5/8/2025: MRI brain normal.  Echo normal.  Stress test normal.  Orthostatics normal.  Cardiology saw and evaluated the patient and recommending a Zio patch on discharge.  Cardiology did also mention a tilt test.  Patient very eager for discharge and would like to pursue all further testing outpatient.  Patient also reports she has a cardiologist she followed up with years ago and she will coordinate follow-up with them; phone number for the group that followed her here provided on discharge paperwork in case she needs them.  Patient also reports she has outpatient follow-up with her sleep doctor for further CPAP management.  PT vestibular referral has been placed for patient per her request.  All labs and imaging findings as well as specialist recommendations discussed with patient who is agreeable for discharge home at this time.    ROS:  General: no fevers, chills  Respiratory: no cough, dyspnea  Cardiovascular: no chest pain, palpitations  Abdomen: No abdominal pain, nausea, vomiting, or diarrhea  Neurologic: No focal weakness    Objective   Physical Exam:  I have reviewed the vital signs.  Temp:  [97.4 °F (36.3 °C)-98.1 °F (36.7 °C)] 97.9 °F (36.6 °C)  Heart Rate:  [68-92] 74  Resp:  [16-20] 16  BP: ()/(47-84) 115/74  General Appearance:    Alert, cooperative, no distress  Head:    Normocephalic, atraumatic, normal hearing  Eyes:    Sclerae anicteric, EOMI  Neck:   Supple, nontender  Lungs: Clear to auscultation bilaterally, respirations unlabored on room air  Heart: Regular rate and rhythm, S1 and S2 normal, no murmur  Abdomen:  Soft, nontender, bowel sounds active, nondistended  Extremities: No clubbing, cyanosis, or edema to lower extremities  Pulses:  2+ and symmetric in distal lower extremities  Skin: No rashes   Neurologic: Oriented x3, Normal strength to  extremities    Results Review:    I have reviewed the labs, radiology results and diagnostic studies.    Results from last 7 days   Lab Units 05/08/25  0324   WBC 10*3/mm3 7.23   HEMOGLOBIN g/dL 13.5   HEMATOCRIT % 41.1   PLATELETS 10*3/mm3 219     Results from last 7 days   Lab Units 05/08/25  0324 05/07/25  2349   SODIUM mmol/L 140 140   POTASSIUM mmol/L 3.8 3.8   CHLORIDE mmol/L 109* 106   CO2 mmol/L 19.4* 22.7   BUN mg/dL 10 11   CREATININE mg/dL 0.62 0.73   CALCIUM mg/dL 8.7 9.0   BILIRUBIN mg/dL  --  0.2   ALK PHOS U/L  --  107   ALT (SGPT) U/L  --  22   AST (SGOT) U/L  --  22   GLUCOSE mg/dL 88 115*     Imaging Results (Last 24 Hours)       Procedure Component Value Units Date/Time    MRI Brain Without Contrast [[id number]] Collected: 05/08/25 1021     Updated: 05/08/25 1021    Narrative:      STAT MRI OF THE BRAIN WITHOUT CONTRAST ON 05/08/2025     CLINICAL HISTORY: This is a 35-year-old female patient who complains of  dizziness and pressure sensations ever since having COVID in 02/2025     TECHNIQUE: Axial T1, FLAIR, fat-suppressed T2, axial diffusion and  gradient echo T2 and sagittal T1-weighted images were obtained of the  entire head.     COMPARISON: This is correlated to a prior outside MRI of the brain from  [facility] performed on 08/16/2021.     FINDINGS: The brain parenchyma is normal in signal intensity.  Specifically no diffusion weighted abnormality is identified with no  acute infarct identified. On the gradient echo T2 weighted images, no  acute or old blood breakdown products are seen intracranially. The  ventricles are normal in size. I see no focal mass effect. There is no  midline shift. No extra-axial fluid collections are identified. There is  mucosal thickening in the inferior left maxillary sinus and there is a  15 mm mucous retention cyst in the posterior inferior left maxillary  sinus. The remainder of the paranasal sinuses and the mastoid air cells  and the  middle ear cavities are clear. The orbits are normal in  appearance. The calvarium and the skull base demonstrate normal marrow  signal intensity. Good flow voids are demonstrated within the cerebral  vessels and in the dural venous sinuses.       Impression:      1. No acute intracranial abnormality is identified.     2. There is a 15 mm mucous retention cyst in the inferior left maxillary  sinus and mild left maxillary sinus mucosal thickening. The remainder of  the MRI of the brain is normal. Specifically no acute infarct is  identified. The etiology of this patient's acute dizziness is not  established on this exam.       CT Outside Films [[id number]] Resulted: 05/08/25 0937     Updated: 05/08/25 0937    Narrative:      This procedure was auto-finalized with no dictation required.    MRI Outside Films [[id number]] Resulted: 05/08/25 0935     Updated: 05/08/25 0935    Narrative:      This procedure was auto-finalized with no dictation required.    CT Angiogram Chest Pulmonary Embolism [[id number]] Collected: 05/08/25 0110     Updated: 05/08/25 0117    Narrative:      CTA CHEST WITH IV CONTRAST     HISTORY: episodes of tachycardia, lightheadedness, chest pain     COMPARISON: None     TECHNIQUE: CT angiography was performed of the chest with axial images  as well as coronal and sagittal reformatted MIP images provided  following administration of IV contrast. 3-D surface rendered reformats  were obtained of the pulmonary arteries and aorta. Radiation dose  reduction techniques were utilized, including automated exposure  control, and exposure modulation based on body size.     FINDINGS:     There is no pulmonary infiltrate, pleural effusion, pneumothorax or  suspicious nodule.     Thoracic aorta is normal in caliber.  There is no convincing evidence of  coronary atherosclerotic vascular calcification.  There is no suspicious  mediastinal adenopathy or other mass, though there is a small hiatal  hernia.     Images of  "the upper abdomen show no acute abnormality.  There is no  acute bony abnormality.     Bolus timing is good and there is no evidence of pulmonary embolism.       Impression:         Pulmonary arteries are well-opacified, and there is no evidence of  pulmonary embolism.     No acute pulmonary parenchymal abnormality is seen.           This report was finalized on 5/8/2025 1:14 AM by Dr. Robert Payan M.D  on Workstation: [id number]       XR Chest 1 View [[id number]] Collected: 05/08/25 0002     Updated: 05/08/25 0006    Narrative:      CXR ONE VIEW      HISTORY: Chest Pain Protocol     COMPARISON: None     TECHNIQUE: single portable AP       Impression:         The heart size is within normal limits.     The lungs are normally aerated. There is no pleural effusion or  pneumothorax.           This report was finalized on 5/8/2025 12:03 AM by Dr. Robert Payan M.D on Workstation: [id number]               I have reviewed the medications.     Discharge Medications        Continue These Medications        Instructions Start Date   B-D 3CC LUER-ART SYR 25GX1\" 25G X 1\" 3 ML misc  Generic drug: Syringe/Needle (Disp)   USE TO INJECT VITAMIN BEFORE-12 ONCE A MONTH             ASK your doctor about these medications        Instructions Start Date   albuterol 108 (90 Base) MCG/ACT inhaler  Commonly known as: PROAIR RESPICLICK   2 Puff, Inhalation, PRN as needed for SOB, 0 Refill(s)      betamethasone dipropionate 0.05 % cream  Commonly known as: DIPROSONE   1 Application, Topical, 2 Times Daily      lidocaine 5 %  Commonly known as: LIDODERM   1 patch, Transdermal, Every 24 Hours      ondansetron 4 MG tablet  Commonly known as: ZOFRAN   TAKE 1 TABLET BY MOUTH AS NEEDED FOR NAUSEA AND VOMITING UPTO FOUR TIMES DAILY      sucralfate 1 g tablet  Commonly known as: CARAFATE   DISSOLVE 1 TABLET IN A FEW OUNCES OF LIQUID OF CHOICE, CAN TAKE UP TO FOUR TIMES DAILY AS NEEDED FOR REFLUX AND DYSPEPSIA      Trelegy Ellipta " 100-62.5-25 MCG/ACT inhaler  Generic drug: Fluticasone-Umeclidin-Vilant   1 puff, Daily              ---------------------------------------------------------------------------------------------  Assessment & Plan   Assessment/Problem List    Lightheadedness      Plan:    Lightheadedness, chest pain  Dizziness  - Initial troponin less than 6, repeat troponin less than 6,   -BNP normal.    -hCG negative.    -D-dimer negative.   -TSH nml  - Chest x-ray shows no acute findings.    -CTA chest shows no acute findings.  No PE.    -EKG shows sinus rhythm 86 bpm, no ST elevation apparent.    -MRI brain normal.  Echo normal.  Stress test normal.  Orthostatics normal.    -Cardiology saw and evaluated the patient and recommending a Zio patch on discharge.  Cardiology did also mention a tilt test.    ---Patient very eager for discharge and would like to pursue all further testing outpatient.  Patient also reports she has a cardiologist she followed up with years ago and she will coordinate follow-up with them; phone number for the group that followed her here provided on discharge paperwork in case she needs them.  Patient also reports she has outpatient follow-up with her sleep doctor for further CPAP management.  PT vestibular referral has been placed for patient per her request.      GERD  - Continue home Carafate     Asthma  - Nonacute exacerbation, continue home inhalers, Trelegy     Morbid obesity, BMI 43.2, encourage lifestyle modifications    Disposition: Discharge to home    Follow-up after Discharge: PCP in 1 to 2 weeks, cardiology in 2 to 3 weeks    This note will serve as a discharge summary    Francy Nicholson PA-C 05/08/25 17:16 EDT    I have worn appropriate PPE during this patient encounter, sanitized my hands both with entering and exiting patient's room.      36 minutes has been spent by [facility] providers in the care of this patient while under observation status on this date  05/08/25

## 2025-05-08 NOTE — ED NOTES
Pt has been to er x3 in last 2 weeks for intermittent episodes of dizziness, lightheaded, chest discomfort lasting several minutes at a time. seen today at pcp and had similar episode and told to come to ed.

## 2025-05-08 NOTE — ED NOTES
Nursing report ED to floor  Aye Villegas  35 y.o.  female    HPI :  HPI  Stated Reason for Visit: Pt has been to er x3 in last 2 weeks for intermittent episodes of dizziness, lightheaded, chest discomfort lasting several minutes at a time. seen today at pcp and had similar episode and told to come to ed.  History Obtained From: patient    Chief Complaint  Chief Complaint   Patient presents with    Dizziness     Pt has been to er x3 in last 2 weeks for intermittent episodes of dizziness, lightheaded, chest discomfort lasting several minutes at a time. seen today at pcp and had similar episode and told to come to ed.        Admitting doctor:   Kamila Vizcaino MD    Admitting diagnosis:   The primary encounter diagnosis was Episodic lightheadedness. Diagnoses of Atypical chest pain and Palpitations were also pertinent to this visit.    Code status:   Current Code Status       Date Active Code Status Order ID Comments User Context       5/8/2025 0147 CPR (Attempt to Resuscitate) [id number]  Surendra Frias PA ED        Question Answer    Code Status (Patient has no pulse and is not breathing) CPR (Attempt to Resuscitate)    Medical Interventions (Patient has pulse or is breathing) Full Support                    Allergies:   Lorazepam, Tilactase, Zolpidem, Bee venom, Doxycycline, Lactose, and Prednisone    Isolation:   No active isolations    Intake and Output  No intake or output data in the 24 hours ending 05/08/25 0156    Weight:       05/07/25  2154   Weight: 114 kg (252 lb)       Most recent vitals:   Vitals:    05/07/25 2329 05/07/25 2336 05/07/25 2351 05/08/25 0000   BP: 110/79 114/65 127/70    BP Location:   Right arm    Patient Position:   Lying    Pulse:   88 86   Resp:   18 20   Temp:       SpO2:   99% 100%   Weight:       Height:           Active LDAs/IV Access:   Lines, Drains & Airways       Active LDAs       Name Placement date Placement time Site Days    Peripheral IV 05/07/25 2357 20 G  Anterior;Right;Upper Arm 05/07/25  2357  Arm  less than 1                    Labs (abnormal labs have a star):   Labs Reviewed   COMPREHENSIVE METABOLIC PANEL - Abnormal; Notable for the following components:       Result Value    Glucose 115 (*)     All other components within normal limits    Narrative:     GFR Categories in Chronic Kidney Disease (CKD)              GFR Category          GFR (mL/min/1.73)    Interpretation  G1                    90 or greater        Normal or high (1)  G2                    60-89                Mild decrease (1)  G3a                   45-59                Mild to moderate decrease  G3b                   30-44                Moderate to severe decrease  G4                    15-29                Severe decrease  G5                    14 or less           Kidney failure    (1)In the absence of evidence of kidney disease, neither GFR category G1 or G2 fulfill the criteria for CKD.    eGFR calculation 2021 CKD-EPI creatinine equation, which does not include race as a factor   CBC WITH AUTO DIFFERENTIAL - Abnormal; Notable for the following components:    RDW 12.2 (*)     All other components within normal limits   TROPONIN - Normal    Narrative:     High Sensitive Troponin T Reference Range:  <14.0 ng/L- Negative Female for AMI  <22.0 ng/L- Negative Male for AMI  >=14 - Abnormal Female indicating possible myocardial injury.  >=22 - Abnormal Male indicating possible myocardial injury.   Clinicians would have to utilize clinical acumen, EKG, Troponin, and serial changes to determine if it is an Acute Myocardial Infarction or myocardial injury due to an underlying chronic condition.        D-DIMER, QUANTITATIVE - Normal    Narrative:     According to the assay 's published package insert, a normal (<0.50 MCGFEU/mL) D-dimer result in conjunction with a non-high clinical probability assessment, excludes deep vein thrombosis (DVT) and pulmonary embolism (PE) with high  "sensitivity.    D-dimer values increase with age and this can make VTE exclusion of an older population difficult. To address this, the [employer], based on best available evidence and recent guidelines, recommends that clinicians use age-adjusted D-dimer thresholds in patients greater than 50 years of age with: a) a low probability of PE who do not meet all Pulmonary Embolism Rule Out Criteria, or b) in those with intermediate probability of PE.   The formula for an age-adjusted D-dimer cut-off is \"age/100\".  For example, a 60 year old patient would have an age-adjusted cut-off of 0.60 MCGFEU/mL and an 80 year old 0.80 MCGFEU/mL.   TSH - Normal   T4, FREE - Normal   RAINBOW DRAW    Narrative:     The following orders were created for panel order [place] Draw.  Procedure                               Abnormality         Status                     ---------                               -----------         ------                     Green Top (Gel)[[id number]]                                  Final result               Lavender Top[[id number]]                                     Final result               Gold Top - SST[[id number]]                                   Final result               Light Blue Top[[id number]]                                   Final result                 Please view results for these tests on the individual orders.   HIGH SENSITIVITIY TROPONIN T 1HR    Narrative:     High Sensitive Troponin T Reference Range:  <14.0 ng/L- Negative Female for AMI  <22.0 ng/L- Negative Male for AMI  >=14 - Abnormal Female indicating possible myocardial injury.  >=22 - Abnormal Male indicating possible myocardial injury.   Clinicians would have to utilize clinical acumen, EKG, Troponin, and serial changes to determine if it is an Acute Myocardial Infarction or myocardial injury due to an underlying chronic condition.        BASIC METABOLIC PANEL   LIPID PANEL   TROPONIN   MANUAL DIFFERENTIAL "   CBC WITH AUTO DIFFERENTIAL   CBC AND DIFFERENTIAL    Narrative:     The following orders were created for panel order CBC & Differential.  Procedure                               Abnormality         Status                     ---------                               -----------         ------                     CBC Auto Differential[[id number]]        Abnormal            Final result                 Please view results for these tests on the individual orders.   GREEN TOP   LAVENDER TOP   GOLD TOP - SST   LIGHT BLUE TOP   CBC AND DIFFERENTIAL    Narrative:     The following orders were created for panel order CBC & Differential.  Procedure                               Abnormality         Status                     ---------                               -----------         ------                     Manual Differential[[id number]]                                                         CBC Auto Differential[[id number]]                                                         Please view results for these tests on the individual orders.       EKG:   ECG 12 Lead Other; dizziness   Preliminary Result   HEART RATE=86  bpm   RR Gkerzppe=911  ms   WY Foaxrmyt=758  ms   P Horizontal Axis=-24  deg   P Front Axis=54  deg   QRSD Interval=89  ms   QT Oyzbjlzy=571  ms   XCiW=211  ms   QRS Axis=39  deg   T Wave Axis=33  deg   - ABNORMAL ECG -   Sinus rhythm   Low voltage, precordial leads   RSR' in V1 or V2, right VCD or RVH   Nonspecific T abnormalities, anterior leads   Date and Time of Study:2025-05-07 22:11:50          Meds given in ED:   Medications   sodium chloride 0.9 % flush 10 mL (has no administration in time range)   nitroglycerin (NITROSTAT) SL tablet 0.4 mg (has no administration in time range)   sodium chloride 0.9 % flush 10 mL (has no administration in time range)   sodium chloride 0.9 % flush 10 mL (has no administration in time range)   sodium chloride 0.9 % infusion 40 mL (has no administration in time range)    acetaminophen (TYLENOL) tablet 650 mg (has no administration in time range)   sennosides-docusate (PERICOLACE) 8.6-50 MG per tablet 2 tablet (has no administration in time range)     And   polyethylene glycol (MIRALAX) packet 17 g (has no administration in time range)     And   bisacodyl (DULCOLAX) EC tablet 5 mg (has no administration in time range)     And   bisacodyl (DULCOLAX) suppository 10 mg (has no administration in time range)   Potassium Replacement - Follow Nurse / BPA Driven Protocol (has no administration in time range)   Magnesium Standard Dose Replacement - Follow Nurse / BPA Driven Protocol (has no administration in time range)   Phosphorus Replacement - Follow Nurse / BPA Driven Protocol (has no administration in time range)   Calcium Replacement - Follow Nurse / BPA Driven Protocol (has no administration in time range)   ondansetron ODT (ZOFRAN-ODT) disintegrating tablet 4 mg (has no administration in time range)     Or   ondansetron (ZOFRAN) injection 4 mg (has no administration in time range)   melatonin tablet 5 mg (has no administration in time range)   aspirin chewable tablet 324 mg (324 mg Oral Given 5/8/25 0049)   iopamidol (ISOVUE-370) 76 % injection 100 mL (95 mL Intravenous Given by Other 5/8/25 0041)       Imaging results:  CT Angiogram Chest Pulmonary Embolism  Result Date: 5/8/2025   Pulmonary arteries are well-opacified, and there is no evidence of pulmonary embolism.  No acute pulmonary parenchymal abnormality is seen.    This report was finalized on 5/8/2025 1:14 AM by Dr. Robert Payan M.D on Workstation: [id number]      XR Chest 1 View  Result Date: 5/8/2025   The heart size is within normal limits.  The lungs are normally aerated. There is no pleural effusion or pneumothorax.    This report was finalized on 5/8/2025 12:03 AM by Dr. Robert Payan M.D on Workstation: [id number]        Ambulatory status:   - standby assist    Social issues:   Social History      Socioeconomic History    Marital status: Single   Tobacco Use    Smoking status: Never    Smokeless tobacco: Never   Vaping Use    Vaping status: Never Used   Substance and Sexual Activity    Alcohol use: Never       Peripheral Neurovascular  Peripheral Neurovascular (Adult)  Peripheral Neurovascular WDL: WDL, pulse assessment  Pulse Assessment: radial, posterior tibial    Neuro Cognitive  Neuro Cognitive (Adult)  Cognitive/Neuro/Behavioral WDL: WDL, level of consciousness, orientation  Level of Consciousness: Alert  Orientation: oriented x 4    Learning  Learning Assessment  Learning Readiness and Ability: no barriers identified    Respiratory  Respiratory WDL  Respiratory WDL: cough, .WDL except  Cough Frequency: frequent  Cough Type: productive, congested    Abdominal Pain       Pain Assessments  Pain (Adult)  (0-10) Pain Rating: Rest: 1  (0-10) Pain Rating: Activity: 1  Pain Location: back  Pain Side/Orientation: medial    NIH Stroke Scale       Sophia Vital RN  05/08/25 01:56 EDT

## 2025-05-08 NOTE — CONSULTS
"Nutrition Services    Patient Name: Aye Villegas  YOB: 1990  MRN: [id number]  Admission date: 5/7/2025    Assessment Date:  05/08/25    NUTRITION EVALUATION      Reason for Encounter Nurse Admission Screen   Diagnosis/Problem Admission Diagnosis:  Palpitations [R00.2]  Lightheadedness [R42]  Atypical chest pain [R07.89]  Episodic lightheadedness [R42]    Problem List:    Lightheadedness     Narrative This is a 34 yo female admitted for lightheadedness. She reports wt gain since April even though having decrease appetite.       PO Diet NPO Diet NPO Type: Strict NPO   Allergies Other: lactose   Supplements n/a   PO Intake % Pending, pt NPO this am       Chewing/Swallowing Difficulty no issues identified at this time       Medications reviewed   Labs  reviewed       Physical Findings alert, oriented     Edema no edema    GI Function WDL   Skin Status skin intact    Lines/Drains none   I/O reviewed        Height  Weight  BMI  Weight Trend     Height: 162.6 cm (64\")  Weight: 114 kg (250 lb 14.4 oz) (05/08/25 0226)  Body mass index is 43.07 kg/m².  Gain         NFPE Not indicated at this time       Nutrition Problem (PES) Problem: Predicted Suboptimal Intake  Etiology: Medical Diagnosis - lightheadedness    Signs/Symptoms: Report/Observation       Intervention/Plan Will monitor po intake and honor food preferences.  RD to follow up per protocol.       Results from last 7 days   Lab Units 05/08/25  0324 05/07/25  2349   SODIUM mmol/L 140 140   POTASSIUM mmol/L 3.8 3.8   CHLORIDE mmol/L 109* 106   CO2 mmol/L 19.4* 22.7   BUN mg/dL 10 11   CREATININE mg/dL 0.62 0.73   CALCIUM mg/dL 8.7 9.0   BILIRUBIN mg/dL  --  0.2   ALK PHOS U/L  --  107   ALT (SGPT) U/L  --  22   AST (SGOT) U/L  --  22   GLUCOSE mg/dL 88 115*     Results from last 7 days   Lab Units 05/08/25  0324   HEMOGLOBIN g/dL 13.5   HEMATOCRIT % 41.1   TRIGLYCERIDES mg/dL 82     Lab Results   Component Value Date    HGBA1C 5.1 09/13/2021     Wt " Readings from Last 10 Encounters:   05/08/25 114 kg (250 lb 14.4 oz)   10/18/24 107 kg (235 lb)   02/24/14 103 kg (227 lb 0.1 oz)   01/13/14 105 kg (231 lb)   12/13/13 108 kg (238 lb 0.1 oz)   11/13/13 107 kg (236 lb)       Electronically signed by:  Anaya Patel RD  05/08/25 09:10 EDT

## 2025-05-08 NOTE — PLAN OF CARE
Goal Outcome Evaluation:  Plan of Care Reviewed With: patient        Progress: improving  Outcome Evaluation: Pt. D/C today. Pt. To follow up with PCP and Cardiology outpatient. At this time VSS, pt. A&Ox4, and all questions answered.       Problem: Adult Inpatient Plan of Care  Goal: Plan of Care Review  Outcome: Met  Flowsheets (Taken 5/8/2025 1828)  Progress: improving  Outcome Evaluation: Pt. D/C today. Pt. To follow up with PCP and Cardiology outpatient. At this time VSS, pt. A&Ox4, and all questions answered.  Plan of Care Reviewed With: patient  Goal: Patient-Specific Goal (Individualized)  Outcome: Met  Goal: Absence of Hospital-Acquired Illness or Injury  Outcome: Met  Intervention: Identify and Manage Fall Risk  Recent Flowsheet Documentation  Taken 5/8/2025 1654 by Joseph Alejandra RN  Safety Promotion/Fall Prevention:   activity supervised   fall prevention program maintained   lighting adjusted   safety round/check completed  Taken 5/8/2025 1446 by Joseph Alejandra RN  Safety Promotion/Fall Prevention:   activity supervised   fall prevention program maintained   lighting adjusted   safety round/check completed  Taken 5/8/2025 1200 by Joseph Alejandra RN  Safety Promotion/Fall Prevention:   activity supervised   fall prevention program maintained   lighting adjusted   safety round/check completed  Taken 5/8/2025 1000 by Joseph Alejandra RN  Safety Promotion/Fall Prevention: patient off unit  Taken 5/8/2025 0800 by Jsoeph Alejandra RN  Safety Promotion/Fall Prevention:   activity supervised   fall prevention program maintained   lighting adjusted   safety round/check completed  Intervention: Prevent Skin Injury  Recent Flowsheet Documentation  Taken 5/8/2025 0800 by Joseph Alejandra RN  Body Position: position changed independently  Intervention: Prevent and Manage VTE (Venous Thromboembolism) Risk  Recent Flowsheet Documentation  Taken 5/8/2025 0800 by Joseph Alejandra RN  VTE Prevention/Management:   SCDs (sequential  compression devices) off   patient refused intervention  Intervention: Prevent Infection  Recent Flowsheet Documentation  Taken 5/8/2025 1654 by Joseph Alejandra RN  Infection Prevention:   hand hygiene promoted   single patient room provided   rest/sleep promoted  Taken 5/8/2025 1446 by Joseph Alejandra RN  Infection Prevention:   hand hygiene promoted   single patient room provided   rest/sleep promoted  Taken 5/8/2025 1200 by Joseph Alejandra RN  Infection Prevention:   hand hygiene promoted   single patient room provided   rest/sleep promoted  Taken 5/8/2025 0800 by Joseph Alejandra RN  Infection Prevention:   hand hygiene promoted   single patient room provided   rest/sleep promoted  Goal: Optimal Comfort and Wellbeing  Outcome: Met  Intervention: Monitor Pain and Promote Comfort  Recent Flowsheet Documentation  Taken 5/8/2025 0800 by Joseph Alejandra RN  Pain Management Interventions: care clustered  Intervention: Provide Person-Centered Care  Recent Flowsheet Documentation  Taken 5/8/2025 0800 by Joseph Alejandra RN  Trust Relationship/Rapport:   care explained   questions answered   questions encouraged  Goal: Readiness for Transition of Care  Outcome: Met     Problem: Fall Injury Risk  Goal: Absence of Fall and Fall-Related Injury  Outcome: Met  Intervention: Identify and Manage Contributors  Recent Flowsheet Documentation  Taken 5/8/2025 1654 by Joseph Alejandra, RN  Medication Review/Management: medications reviewed  Taken 5/8/2025 1446 by Joseph Alejandra RN  Medication Review/Management: medications reviewed  Taken 5/8/2025 1200 by Joseph Alejandra, RN  Medication Review/Management: medications reviewed  Taken 5/8/2025 0800 by Joseph Alejandra, RN  Medication Review/Management: medications reviewed  Self-Care Promotion: independence encouraged  Intervention: Promote Injury-Free Environment  Recent Flowsheet Documentation  Taken 5/8/2025 1654 by Joseph Alejandra, RN  Safety Promotion/Fall Prevention:   activity supervised   fall  prevention program maintained   lighting adjusted   safety round/check completed  Taken 5/8/2025 1446 by Joseph Alejandra RN  Safety Promotion/Fall Prevention:   activity supervised   fall prevention program maintained   lighting adjusted   safety round/check completed  Taken 5/8/2025 1200 by Joseph Alejandra RN  Safety Promotion/Fall Prevention:   activity supervised   fall prevention program maintained   lighting adjusted   safety round/check completed  Taken 5/8/2025 1000 by Joseph Alejandra RN  Safety Promotion/Fall Prevention: patient off unit  Taken 5/8/2025 0800 by Joseph Alejandra RN  Safety Promotion/Fall Prevention:   activity supervised   fall prevention program maintained   lighting adjusted   safety round/check completed     Problem: Comorbidity Management  Goal: Maintenance of Behavioral Health Symptom Control  Outcome: Met  Intervention: Maintain Behavioral Health Symptom Control  Recent Flowsheet Documentation  Taken 5/8/2025 1654 by Joseph Alejandra RN  Medication Review/Management: medications reviewed  Taken 5/8/2025 1446 by Joseph Alejandra RN  Medication Review/Management: medications reviewed  Taken 5/8/2025 1200 by Joseph Alejandra RN  Medication Review/Management: medications reviewed  Taken 5/8/2025 0800 by Joseph Alejandra RN  Medication Review/Management: medications reviewed     Problem: Skin Injury Risk Increased  Goal: Skin Health and Integrity  Outcome: Met  Intervention: Optimize Skin Protection  Recent Flowsheet Documentation  Taken 5/8/2025 1654 by Joseph Alejandra RN  Activity Management:   up ad stephani   activity encouraged  Taken 5/8/2025 1446 by Joseph Alejandra RN  Activity Management: activity encouraged  Taken 5/8/2025 1200 by Joseph Alejandra, RN  Activity Management:   activity encouraged   back to bed  Taken 5/8/2025 0800 by Joseph Alejandra, RN  Activity Management:   activity encouraged   back to bed  Pressure Reduction Techniques: frequent weight shift encouraged  Head of Bed (HOB) Positioning: HOB  elevated

## 2025-05-08 NOTE — PLAN OF CARE
Goal Outcome Evaluation:      Pt. C/o lightheadedness. NPO for cardio consult. Nutrition consult ordered due to patient reporting lack of appetite. Echo and MRI ordered.

## 2025-05-08 NOTE — ED PROVIDER NOTES
I have personally performed a face-to-face diagnostic evaluation of the patient.  I have reviewed and agree with the care plan as outlined by NP/PA.  My findings are as follows:    HPI:  Patient is a 35 y.o. female who presents with a several week history of intermittent lightheadedness, palpitations, vertigo.  She has been doing albuterol with these episodes.  She does not notice any provoking factors, time of the day that seems to worsen the symptoms.  She has been seen in the emergency department and was referred back to primary care after reassuring workups.  She saw her primary care today, who recommended she come to the hospital for further testing.      PE:  Physical Exam  Constitutional:       Appearance: Normal appearance.   HENT:      Head: Normocephalic and atraumatic.   Eyes:      Pupils: Pupils are equal, round, and reactive to light.   Cardiovascular:      Rate and Rhythm: Normal rate and regular rhythm.      Heart sounds: No murmur heard.  Abdominal:      Tenderness: There is no abdominal tenderness. There is no guarding or rebound.   Skin:     General: Skin is warm.   Neurological:      Mental Status: She is alert and oriented to person, place, and time.           MDM:  I provided a substantiate portion of the care of this patient. I personally performed the medical decision making.    Medical records are reviewed in [facility] and Care Everywhere, if applicable    EKG Interpreted by me: Normal sinus rhythm, normal axis, no ST segment changes    Recent Results (from the past 24 hours)   ECG 12 Lead Other; dizziness    Collection Time: 05/07/25 10:11 PM   Result Value Ref Range    QT Interval 370 ms    QTC Interval 444 ms   Comprehensive Metabolic Panel    Collection Time: 05/07/25 11:49 PM    Specimen: Blood   Result Value Ref Range    Glucose 115 (H) 65 - 99 mg/dL    BUN 11 6 - 20 mg/dL    Creatinine 0.73 0.57 - 1.00 mg/dL    Sodium 140 136 - 145 mmol/L    Potassium 3.8 3.5 - 5.2 mmol/L    Chloride 106  98 - 107 mmol/L    CO2 22.7 22.0 - 29.0 mmol/L    Calcium 9.0 8.6 - 10.5 mg/dL    Total Protein 6.9 6.0 - 8.5 g/dL    Albumin 4.1 3.5 - 5.2 g/dL    ALT (SGPT) 22 1 - 33 U/L    AST (SGOT) 22 1 - 32 U/L    Alkaline Phosphatase 107 39 - 117 U/L    Total Bilirubin 0.2 0.0 - 1.2 mg/dL    Globulin 2.8 gm/dL    A/G Ratio 1.5 g/dL    BUN/Creatinine Ratio 15.1 7.0 - 25.0    Anion Gap 11.3 5.0 - 15.0 mmol/L    eGFR 109.5 >60.0 mL/min/1.73   High Sensitivity Troponin T    Collection Time: 05/07/25 11:49 PM    Specimen: Blood   Result Value Ref Range    HS Troponin T <6 <14 ng/L   D-dimer, Quantitative    Collection Time: 05/07/25 11:49 PM    Specimen: Blood   Result Value Ref Range    D-Dimer, Quantitative <0.27 0.00 - 0.50 MCGFEU/mL   Green Top (Gel)    Collection Time: 05/07/25 11:49 PM   Result Value Ref Range    Extra Tube Hold for add-ons.    Lavender Top    Collection Time: 05/07/25 11:49 PM   Result Value Ref Range    Extra Tube hold for add-on    Gold Top - SST    Collection Time: 05/07/25 11:49 PM   Result Value Ref Range    Extra Tube Hold for add-ons.    Light Blue Top    Collection Time: 05/07/25 11:49 PM   Result Value Ref Range    Extra Tube Hold for add-ons.    TSH    Collection Time: 05/07/25 11:49 PM    Specimen: Blood   Result Value Ref Range    TSH 3.540 0.270 - 4.200 uIU/mL   T4, Free    Collection Time: 05/07/25 11:49 PM    Specimen: Blood   Result Value Ref Range    Free T4 1.05 0.92 - 1.68 ng/dL   CBC Auto Differential    Collection Time: 05/08/25  1:03 AM    Specimen: Arm, Right; Blood   Result Value Ref Range    WBC 7.44 3.40 - 10.80 10*3/mm3    RBC 4.33 3.77 - 5.28 10*6/mm3    Hemoglobin 13.2 12.0 - 15.9 g/dL    Hematocrit 40.4 34.0 - 46.6 %    MCV 93.3 79.0 - 97.0 fL    MCH 30.5 26.6 - 33.0 pg    MCHC 32.7 31.5 - 35.7 g/dL    RDW 12.2 (L) 12.3 - 15.4 %    RDW-SD 42.0 37.0 - 54.0 fl    MPV 11.9 6.0 - 12.0 fL    Platelets 218 140 - 450 10*3/mm3    Neutrophil % 55.7 42.7 - 76.0 %    Lymphocyte % 30.1  19.6 - 45.3 %    Monocyte % 10.2 5.0 - 12.0 %    Eosinophil % 3.0 0.3 - 6.2 %    Basophil % 0.7 0.0 - 1.5 %    Immature Grans % 0.3 0.0 - 0.5 %    Neutrophils, Absolute 4.15 1.70 - 7.00 10*3/mm3    Lymphocytes, Absolute 2.24 0.70 - 3.10 10*3/mm3    Monocytes, Absolute 0.76 0.10 - 0.90 10*3/mm3    Eosinophils, Absolute 0.22 0.00 - 0.40 10*3/mm3    Basophils, Absolute 0.05 0.00 - 0.20 10*3/mm3    Immature Grans, Absolute 0.02 0.00 - 0.05 10*3/mm3    nRBC 0.0 0.0 - 0.2 /100 WBC   High Sensitivity Troponin T 1Hr    Collection Time: 05/08/25  1:03 AM    Specimen: Arm, Right; Blood   Result Value Ref Range    HS Troponin T <6 <14 ng/L    Troponin T Numeric Delta     CBC Auto Differential    Collection Time: 05/08/25  3:24 AM    Specimen: Arm, Left; Blood   Result Value Ref Range    WBC 7.23 3.40 - 10.80 10*3/mm3    RBC 4.42 3.77 - 5.28 10*6/mm3    Hemoglobin 13.5 12.0 - 15.9 g/dL    Hematocrit 41.1 34.0 - 46.6 %    MCV 93.0 79.0 - 97.0 fL    MCH 30.5 26.6 - 33.0 pg    MCHC 32.8 31.5 - 35.7 g/dL    RDW 12.6 12.3 - 15.4 %    RDW-SD 42.7 37.0 - 54.0 fl    MPV 11.9 6.0 - 12.0 fL    Platelets 219 140 - 450 10*3/mm3     I have reviewed the above labs    CT Angiogram Chest Pulmonary Embolism  Result Date: 5/8/2025  CTA CHEST WITH IV CONTRAST  HISTORY: episodes of tachycardia, lightheadedness, chest pain  COMPARISON: None  TECHNIQUE: CT angiography was performed of the chest with axial images as well as coronal and sagittal reformatted MIP images provided following administration of IV contrast. 3-D surface rendered reformats were obtained of the pulmonary arteries and aorta. Radiation dose reduction techniques were utilized, including automated exposure control, and exposure modulation based on body size.  FINDINGS:  There is no pulmonary infiltrate, pleural effusion, pneumothorax or suspicious nodule.  Thoracic aorta is normal in caliber.  There is no convincing evidence of coronary atherosclerotic vascular calcification.   There is no suspicious mediastinal adenopathy or other mass, though there is a small hiatal hernia.  Images of the upper abdomen show no acute abnormality.  There is no acute bony abnormality.  Bolus timing is good and there is no evidence of pulmonary embolism.       Pulmonary arteries are well-opacified, and there is no evidence of pulmonary embolism.  No acute pulmonary parenchymal abnormality is seen.    This report was finalized on 5/8/2025 1:14 AM by Dr. Robert Payan M.D on Workstation: [id number]      XR Chest 1 View  Result Date: 5/8/2025  CXR ONE VIEW  HISTORY: Chest Pain Protocol  COMPARISON: None  TECHNIQUE: single portable AP       The heart size is within normal limits.  The lungs are normally aerated. There is no pleural effusion or pneumothorax.    This report was finalized on 5/8/2025 12:03 AM by Dr. Robert Payan M.D on Workstation: [id number]        My independent interpretation of the cxr: No acute process    This is an overall well-appearing 35-year-old female who is presenting today secondary to episodes of lightheadedness, palpitations and possibly vertigo.  She overall looks well at the time of my evaluation, but is not acutely having symptoms.  Her cardiopulmonary exam is overall benign.    she had an EKG here which not demonstrate any cardiogenic cause of syncope like WPW, HOCM, Brugada, A-V dissociation, prolonged QTC.  Labs did not demonstrate any acute metabolic etiology of symptoms.    The patient was evaluated with a CT angiogram of the chest.  This did not demonstrate any acute intrathoracic cause of symptoms.    Given persistent symptoms despite multiple ER evaluations with no underlying etiology, patient will be admitted for further testing.  Discussed with patient that we might not find an underlying etiology.  She is counseled to stop albuterol use during these episodes as the beta adrenergic effect could be compounding the symptoms.      Impression:  Final diagnoses:    Episodic lightheadedness   Atypical chest pain   Palpitations         Disposition:  ED Disposition       ED Disposition   Decision to Admit    Condition   --    Comment   --                Antonia Milan MD  05/08/25 0354

## 2025-05-08 NOTE — H&P
[facility]   HISTORY AND PHYSICAL    Patient Name: Aye Villegas  : 1990  MRN: [id number]  Primary Care Physician:  Ning Silva MD  Date of admission: 2025    Subjective   Subjective     Chief Complaint:   Chief Complaint   Patient presents with    Dizziness     Pt has been to er x3 in last 2 weeks for intermittent episodes of dizziness, lightheaded, chest discomfort lasting several minutes at a time. seen today at pcp and had similar episode and told to come to ed.          HPI:    Aye Villegas is a 35 y.o. female comes in complaining of multiple complaints.  Patient states that she is not felt at baseline for several weeks.  Patient states that she feels that she has been having intermittent episodes of dizziness and lightheadedness.  Patient initially describes these episodes feeling like she is going to pass out feeling.  Patient states she will feel a pop in her chest when she takes a deep breath and and subsequently feels lightheaded feel like she is going to pass out.  Patient denies any episode of loss of consciousness or falls or injury.  Patient also reports a feeling of dizziness as well and was told that her doctor told her to get an MRI of her brain.  Patient was seen by her primary care provider today for follow-up of multiple ED visits for similar symptoms and patient is not sure what is causing her symptoms.  Patient does report that she is supposed to use a CPAP however she has not used it for several years.  Patient states she has a follow-up visit with the sleep clinic for this.  Patient states when she takes a deep breath even right now she will feel lightheaded.  Patient also states she has had to have her esophagus stretched and wonders if this is the issue as well however patient denies any  vomiting.  Patient states that she was seen last week at a different ER and was told that she was dehydrated and given IV fluids.  Patient also reports a chronic nonproductive cough for  the past several months as well.    In the ED, CBC and CMP largely unremarkable for acute findings.  Initial troponin less than 6, repeat troponin less than 6, BNP normal.  hCG negative.  D-dimer negative.  Chest x-ray shows no acute findings.  CTA chest shows no acute findings.  No PE.  EKG shows sinus rhythm 86 bpm, no ST elevation apparent.  Patient is afebrile, pulse in the 80s, on room air oxygen not on percent SpO2 and blood pressure normotensive.        Review of Systems   All systems were reviewed and negative except for: as per HPI    Personal History     Past Medical History:   Diagnosis Date    Anxiety     Asthma     Depression     GERD (gastroesophageal reflux disease)     Hidradenitis        No past surgical history on file.    Family History: family history is not on file. Otherwise pertinent FHx was reviewed and not pertinent to current issue.    Social History:  reports that she has never smoked. She has never used smokeless tobacco. She reports that she does not drink alcohol.    Home Medications:  Fluticasone-Umeclidin-Vilant, Syringe/Needle (Disp), albuterol, betamethasone dipropionate, lidocaine, ondansetron, and sucralfate    Allergies:  Allergies   Allergen Reactions    Lorazepam Anxiety     pt states makes me feel more depressedstates emotional reaction    pt states it makes her more depressed feeling    Tilactase Swelling    Zolpidem Unknown - High Severity and Headache     Makes me extremely depressed.     Outside Source Comment: Makes me extremely depressed.    Makes me extremely depressed.    Bee Venom Unknown - High Severity    Doxycycline Unknown - High Severity     pt states she told her doctor she did not want to take, that her mother won't take due to pseudotumors and he listed it as an allergystates emotional reaction    Lactose Other (See Comments) and Unknown (See Comments)    Prednisone Anxiety       Objective   Objective     Vitals:   Temp:  [98.1 °F (36.7 °C)] 98.1 °F (36.7  °C)  Heart Rate:  [75-88] 75  Resp:  [18-20] 20  BP: (109-127)/(57-83) 109/57  Physical Exam    Constitutional: Awake, alert   Eyes: PERRLA, sclerae anicteric, no conjunctival injection   HENT: NCAT, mucous membranes moist   Neck: Supple, no thyromegaly, no lymphadenopathy, trachea midline   Respiratory: Clear to auscultation bilaterally, nonlabored respirations    Cardiovascular: RRR, no murmurs, rubs, or gallops, palpable pedal pulses bilaterally   Gastrointestinal: Positive bowel sounds, soft, nontender, nondistended   Musculoskeletal: No bilateral ankle edema, no clubbing or cyanosis to extremities   Psychiatric: Appropriate affect, cooperative   Neurologic: Oriented x 3, strength symmetric in all extremities, Cranial Nerves grossly intact to confrontation, speech clear   Skin: No rashes     Result Review    Result Review:  I have personally reviewed the results from the time of this admission to 5/8/2025 02:19 EDT and agree with these findings:  [x]  Laboratory list / accordion  []  Microbiology  [x]  Radiology  [x]  EKG/Telemetry   []  Cardiology/Vascular   []  Pathology  []  Old records  []  Other:  Most notable findings include: see above      The ASCVD Risk score (Jose Maria MCNAMARA, et al., 2019) failed to calculate for the following reasons:    The 2019 ASCVD risk score is only valid for ages 40 to 79      Assessment & Plan   Assessment / Plan     Brief Patient Summary:  Aye Villegas is a 35 y.o. female who comes in complaining of lightheadedness    Active Hospital Problems:  Active Hospital Problems    Diagnosis     **Lightheadedness      Plan:       Lightheadedness, chest pain  Dizziness  -CBC and CMP largely unremarkable for acute findings.   - Initial troponin less than 6, repeat troponin less than 6,   -BNP normal.    -hCG negative.    -D-dimer negative.   -TSH nml  - Chest x-ray shows no acute findings.    -CTA chest shows no acute findings.  No PE.    -EKG shows sinus rhythm 86 bpm, no ST elevation  apparent.    - Cardiology consult  - MRI brain without contrast  - Check echo, mag, orthostatics  - Repeat troponin, EKG  - PT consult vestibular  - Continuous cardiac monitoring   - NPO  - Consider neurology consult    GERD  - Continue home Carafate    Asthma  - Nonacute exacerbation, continue home inhalers, Trelegy    Morbid obesity, BMI 43.2, encourage lifestyle modifications        VTE Prophylaxis: SCDs  Mechanical VTE prophylaxis orders are present.        CODE STATUS:    Code Status (Patient has no pulse and is not breathing): CPR (Attempt to Resuscitate)  Medical Interventions (Patient has pulse or is breathing): Full Support    Admission Status:  I believe this patient meets observation status.    78 minutes have been spent by [facility] providers in the care of this patient while under observation status.      Appropriate PPE worn during patient encounter.  Hand hygeine performed before and after seeing the patient.      Electronically signed by VINOD Huggins, 05/08/25, 1:33 AM EDT.

## 2025-05-08 NOTE — ED PROVIDER NOTES
" EMERGENCY DEPARTMENT ENCOUNTER  Room Number:  06/06  PCP: Ning Silva MD  Independent Historians: Patient      HPI:  Chief Complaint: had concerns including Dizziness (Pt has been to er x3 in last 2 weeks for intermittent episodes of dizziness, lightheaded, chest discomfort lasting several minutes at a time. seen today at pcp and had similar episode and told to come to ed. ).     A complete HPI/ROS/PMH/PSH/SH/FH are unobtainable due to: None    Chronic or social conditions impacting patient care (Social Determinants of Health): None      Context: Aye Villegas is a 35 y.o. female with a medical history of anxiety, depression, asthma, and GERD presents emergency department today with episodic chest tightness, lightheadedness, and dizziness.  Patient says it occurs intermittently and she denies any palliative or provocative factors.  She  says it starts with a tightness in her chest and then she feels lightheaded and feels like she may \"fall over\".  She says it lasted about 20 minutes before resolving.  She denies associated nausea or vomiting.  She denies diarrhea.  She has been seen multiple times in the emergency department for this complaint and actually followed up with her primary care doctor today and had 1 of these episodes in the office and recommended she come in for further testing.  Patient does have a history of asthma and uses her inhaler rather frequently.  Patient evaluation due to history of abnormal.  She denies leg swelling or any history of DVT/PE.  She denies any history of thyroid dysfunction.      Review of prior external notes (non-ED) -and- Review of prior external test results outside of this encounter:   She was seen in the emergency department on 4/22/2025 for episodes of lightheadedness and vertigo.  She reported to them this started after coughing.  Exam was benign.  She was evaluated with labs which were reassuring.  Respiratory panel, D-dimer, and troponin were all negative.  Chest " x-ray unremarkable.  They treated her with meclizine and fluids.    Patient then was seen again in the emergency department on 5/2/2025 for episodes of shortness of breath and near syncope.  Patient describes it as feeling like she was breathing through a straw.  She did reports she was drinking a lot of coffee and tea.  She does admit that when these episodes occur she becomes incredibly anxious because she feels like she is going to pass out.          PAST MEDICAL HISTORY  Active Ambulatory Problems     Diagnosis Date Noted    No Active Ambulatory Problems     Resolved Ambulatory Problems     Diagnosis Date Noted    No Resolved Ambulatory Problems     Past Medical History:   Diagnosis Date    Anxiety     Asthma     Depression     GERD (gastroesophageal reflux disease)     Hidradenitis          PAST SURGICAL HISTORY  No past surgical history on file.      FAMILY HISTORY  No family history on file.      SOCIAL HISTORY  Social History     Socioeconomic History    Marital status: Single   Tobacco Use    Smoking status: Never    Smokeless tobacco: Never   Vaping Use    Vaping status: Never Used   Substance and Sexual Activity    Alcohol use: Never         ALLERGIES  Lorazepam, Tilactase, Zolpidem, Bee venom, Doxycycline, Lactose, and Prednisone      REVIEW OF SYSTEMS  Included in HPI  All systems reviewed and negative except for those discussed in HPI.      PHYSICAL EXAM    I have reviewed the triage vital signs and nursing notes.    ED Triage Vitals   Temp Heart Rate Resp BP SpO2   05/07/25 2159 05/07/25 2154 05/07/25 2154 05/07/25 2159 05/07/25 2154   98.1 °F (36.7 °C) 79 18 126/83 100 %      Temp src Heart Rate Source Patient Position BP Location FiO2 (%)   -- -- -- -- --              Physical Exam  Constitutional:       General: She is not in acute distress.     Appearance: Normal appearance. She is obese.   HENT:      Head: Normocephalic and atraumatic.      Nose: Nose normal.      Mouth/Throat:      Mouth: Mucous  membranes are moist.   Eyes:      Extraocular Movements: Extraocular movements intact.      Pupils: Pupils are equal, round, and reactive to light.   Cardiovascular:      Rate and Rhythm: Normal rate and regular rhythm.      Pulses: Normal pulses.      Heart sounds: Normal heart sounds.   Pulmonary:      Effort: Pulmonary effort is normal. No respiratory distress.      Breath sounds: Normal breath sounds. No wheezing, rhonchi or rales.   Abdominal:      General: Abdomen is flat. There is no distension.      Palpations: Abdomen is soft.      Tenderness: There is no abdominal tenderness.   Musculoskeletal:         General: Normal range of motion.      Cervical back: Normal range of motion and neck supple.      Right lower leg: No edema.      Left lower leg: No edema.   Skin:     General: Skin is warm and dry.      Capillary Refill: Capillary refill takes less than 2 seconds.   Neurological:      General: No focal deficit present.      Mental Status: She is alert and oriented to person, place, and time.   Psychiatric:         Mood and Affect: Mood normal.         Behavior: Behavior normal.         LAB RESULTS  Recent Results (from the past 24 hours)   ECG 12 Lead Other; dizziness    Collection Time: 05/07/25 10:11 PM   Result Value Ref Range    QT Interval 370 ms    QTC Interval 444 ms   Comprehensive Metabolic Panel    Collection Time: 05/07/25 11:49 PM    Specimen: Blood   Result Value Ref Range    Glucose 115 (H) 65 - 99 mg/dL    BUN 11 6 - 20 mg/dL    Creatinine 0.73 0.57 - 1.00 mg/dL    Sodium 140 136 - 145 mmol/L    Potassium 3.8 3.5 - 5.2 mmol/L    Chloride 106 98 - 107 mmol/L    CO2 22.7 22.0 - 29.0 mmol/L    Calcium 9.0 8.6 - 10.5 mg/dL    Total Protein 6.9 6.0 - 8.5 g/dL    Albumin 4.1 3.5 - 5.2 g/dL    ALT (SGPT) 22 1 - 33 U/L    AST (SGOT) 22 1 - 32 U/L    Alkaline Phosphatase 107 39 - 117 U/L    Total Bilirubin 0.2 0.0 - 1.2 mg/dL    Globulin 2.8 gm/dL    A/G Ratio 1.5 g/dL    BUN/Creatinine Ratio 15.1 7.0  - 25.0    Anion Gap 11.3 5.0 - 15.0 mmol/L    eGFR 109.5 >60.0 mL/min/1.73   High Sensitivity Troponin T    Collection Time: 05/07/25 11:49 PM    Specimen: Blood   Result Value Ref Range    HS Troponin T <6 <14 ng/L   D-dimer, Quantitative    Collection Time: 05/07/25 11:49 PM    Specimen: Blood   Result Value Ref Range    D-Dimer, Quantitative <0.27 0.00 - 0.50 MCGFEU/mL   Green Top (Gel)    Collection Time: 05/07/25 11:49 PM   Result Value Ref Range    Extra Tube Hold for add-ons.    Lavender Top    Collection Time: 05/07/25 11:49 PM   Result Value Ref Range    Extra Tube hold for add-on    Gold Top - SST    Collection Time: 05/07/25 11:49 PM   Result Value Ref Range    Extra Tube Hold for add-ons.    Light Blue Top    Collection Time: 05/07/25 11:49 PM   Result Value Ref Range    Extra Tube Hold for add-ons.    CBC Auto Differential    Collection Time: 05/08/25  1:03 AM    Specimen: Arm, Right; Blood   Result Value Ref Range    WBC 7.44 3.40 - 10.80 10*3/mm3    RBC 4.33 3.77 - 5.28 10*6/mm3    Hemoglobin 13.2 12.0 - 15.9 g/dL    Hematocrit 40.4 34.0 - 46.6 %    MCV 93.3 79.0 - 97.0 fL    MCH 30.5 26.6 - 33.0 pg    MCHC 32.7 31.5 - 35.7 g/dL    RDW 12.2 (L) 12.3 - 15.4 %    RDW-SD 42.0 37.0 - 54.0 fl    MPV 11.9 6.0 - 12.0 fL    Platelets 218 140 - 450 10*3/mm3    Neutrophil % 55.7 42.7 - 76.0 %    Lymphocyte % 30.1 19.6 - 45.3 %    Monocyte % 10.2 5.0 - 12.0 %    Eosinophil % 3.0 0.3 - 6.2 %    Basophil % 0.7 0.0 - 1.5 %    Immature Grans % 0.3 0.0 - 0.5 %    Neutrophils, Absolute 4.15 1.70 - 7.00 10*3/mm3    Lymphocytes, Absolute 2.24 0.70 - 3.10 10*3/mm3    Monocytes, Absolute 0.76 0.10 - 0.90 10*3/mm3    Eosinophils, Absolute 0.22 0.00 - 0.40 10*3/mm3    Basophils, Absolute 0.05 0.00 - 0.20 10*3/mm3    Immature Grans, Absolute 0.02 0.00 - 0.05 10*3/mm3    nRBC 0.0 0.0 - 0.2 /100 WBC   High Sensitivity Troponin T 1Hr    Collection Time: 05/08/25  1:03 AM    Specimen: Arm, Right; Blood   Result Value Ref Range     HS Troponin T <6 <14 ng/L    Troponin T Numeric Delta           RADIOLOGY  CT Angiogram Chest Pulmonary Embolism  Result Date: 5/8/2025  CTA CHEST WITH IV CONTRAST  HISTORY: episodes of tachycardia, lightheadedness, chest pain  COMPARISON: None  TECHNIQUE: CT angiography was performed of the chest with axial images as well as coronal and sagittal reformatted MIP images provided following administration of IV contrast. 3-D surface rendered reformats were obtained of the pulmonary arteries and aorta. Radiation dose reduction techniques were utilized, including automated exposure control, and exposure modulation based on body size.  FINDINGS:  There is no pulmonary infiltrate, pleural effusion, pneumothorax or suspicious nodule.  Thoracic aorta is normal in caliber.  There is no convincing evidence of coronary atherosclerotic vascular calcification.  There is no suspicious mediastinal adenopathy or other mass, though there is a small hiatal hernia.  Images of the upper abdomen show no acute abnormality.  There is no acute bony abnormality.  Bolus timing is good and there is no evidence of pulmonary embolism.       Pulmonary arteries are well-opacified, and there is no evidence of pulmonary embolism.  No acute pulmonary parenchymal abnormality is seen.    This report was finalized on 5/8/2025 1:14 AM by Dr. Robert Payan M.D on Workstation: [id number]      XR Chest 1 View  Result Date: 5/8/2025  CXR ONE VIEW  HISTORY: Chest Pain Protocol  COMPARISON: None  TECHNIQUE: single portable AP       The heart size is within normal limits.  The lungs are normally aerated. There is no pleural effusion or pneumothorax.    This report was finalized on 5/8/2025 12:03 AM by Dr. Robert Payan M.D on Workstation: [id number]          MEDICATIONS GIVEN IN ER  Medications   sodium chloride 0.9 % flush 10 mL (has no administration in time range)   aspirin chewable tablet 324 mg (324 mg Oral Given 5/8/25 0049)   iopamidol  "(ISOVUE-370) 76 % injection 100 mL (95 mL Intravenous Given by Other 5/8/25 0041)           OUTPATIENT MEDICATION MANAGEMENT:  Current Facility-Administered Medications Ordered in Epic   Medication Dose Route Frequency Provider Last Rate Last Admin    sodium chloride 0.9 % flush 10 mL  10 mL Intravenous PRN Antonia Milan MD         Current Outpatient Medications Ordered in Epic   Medication Sig Dispense Refill    albuterol (PROAIR RESPICLICK) 108 (90 Base) MCG/ACT inhaler 2 Puff, Inhalation, PRN as needed for SOB, 0 Refill(s)      B-D 3CC LUER-ART SYR 25GX1\" 25G X 1\" 3 ML misc USE TO INJECT VITAMIN BEFORE-12 ONCE A MONTH      betamethasone dipropionate (DIPROSONE) 0.05 % cream Apply 1 Application topically to the appropriate area as directed 2 (Two) Times a Day. 30 g 0    lidocaine (LIDODERM) 5 % Place 1 patch on the skin as directed by provider Daily.      ondansetron (ZOFRAN) 4 MG tablet TAKE 1 TABLET BY MOUTH AS NEEDED FOR NAUSEA AND VOMITING UPTO FOUR TIMES DAILY      sucralfate (CARAFATE) 1 g tablet DISSOLVE 1 TABLET IN A FEW OUNCES OF LIQUID OF CHOICE, CAN TAKE UP TO FOUR TIMES DAILY AS NEEDED FOR REFLUX AND DYSPEPSIA      Trelegy Ellipta 100-62.5-25 MCG/ACT inhaler Inhale 1 puff Daily.               PROGRESS, DATA ANALYSIS, CONSULTS, AND MEDICAL DECISION MAKING  ORDERS PLACED DURING THIS VISIT:  Orders Placed This Encounter   Procedures    XR Chest 1 View    CT Angiogram Chest Pulmonary Embolism    Falls Draw    Comprehensive Metabolic Panel    High Sensitivity Troponin T    D-dimer, Quantitative    CBC Auto Differential    High Sensitivity Troponin T 1Hr    TSH    T4, Free    Continuous Pulse Oximetry    Vital Signs    Oxygen Therapy- Nasal Cannula; Titrate 1-6 LPM Per SpO2; 90 - 95%    ECG 12 Lead Other; dizziness    ECG 12 Lead Chest Pain    Insert Peripheral IV    CBC & Differential    Green Top (Gel)    Lavender Top    Gold Top - SST    Light Blue Top       All labs have been independently " interpreted by me.  All radiology studies have been reviewed by me. All EKG's have been independently viewed and interpreted by me.  Discussion below represents my analysis of pertinent findings related to patient's condition, differential diagnosis, treatment plan and final disposition.    Differential diagnosis includes but is not limited to:   My differential diagnosis for syncope includes but is not limited to:  Vasovagal reflex - situational stimulus, micturition, defecation, cough, sneezing, swallowing, postprandial state, react sinus hypersensitivity  Vascular-prolonged recumbency, sudden postural change, prolonged standing, hypovolemia, vasodilator drugs, autonomic neuropathy, adrenal insufficiency, subclavian steal, pulmonary embolism  Cardiac -arrhythmia, heart block, myocardial infarction, aortic stenosis, cardiac myxoma, cardiac, LV Dysfunction, Aortic Dissection, Pulmonary Hypertension, Pulmonary Stenosis, Pacemaker Failure  CNS-seizure, hypoxia, hypoglycemia, TIA,(basal vertebral), hydrocephalus      ED Course:  ED Course as of 05/08/25 0139   u May 08, 2025   0004 I discussed the case with Dr. Milan and she agrees to evaluate the patient at the bedside.    [CC]   0031 HS Troponin T: <6 [CC]   0038 Creatinine: 0.73 [CC]   0039 Sodium: 140 [CC]   0100 CT Angiogram Chest Pulmonary Embolism  My independent interpretation of the CTA is no large central pulmonary embolism [CC]   0113 D-Dimer, Quant: <0.27 [CC]   0123 WBC: 7.44 [CC]   0123 Hemoglobin: 13.2 [CC]   0135 Spoke with AYAH Agosto with Observation Unit.  Reviewed history, exam, results, treatments.  He agrees admit the patient to Dr. Vizcaino.  TSH and T4 pending at the time of admission    [CC]      ED Course User Index  [CC] Court Ponce PA-C           AS OF 01:39 EDT VITALS:    BP - 127/70  HR - 86  TEMP - 98.1 °F (36.7 °C)  O2 SATS - 100%      MDM:  Patient is a 35-year-old female presents emergency department today with episodes of  lightheadedness and what sounds like presyncope.  On arrival here in the emergency department vitals are reassuring, she is afebrile.  On my exam the patient is well-appearing and in no acute distress.  She was initially evaluated with an EKG which is nonischemic in nature and shows no concerning arrhythmia.  She was evaluated with labs which were overall reassuring.  D-dimer was negative but given ongoing nature of the symptoms she was evaluated with a CTA which was unremarkable.  She had no episodes of the lightheadedness while here in the emergency department and no palpitations or arrhythmias were noted.  Patient is very concerned about these episodes although they are uncertain etiology.  Patient may benefit from Holter monitor or echocardiogram.  Her follow-up is not scheduled to see cardiology until August.  I did offer her admission to the observation unit and patient would like to be admitted for further workup.  She is agreeable to plan and is stable at the time of admission.      COMPLEXITY OF CARE  The patient requires admission.        DIAGNOSIS  Final diagnoses:   Episodic lightheadedness   Atypical chest pain   Palpitations         DISPOSITION  ED Disposition       ED Disposition   Decision to Admit    Condition   --    Comment   --                      Please note that portions of this document were completed with a voice recognition program.    Note Disclaimer: At [facility], we believe that sharing information builds trust and better relationships. You are receiving this note because you recently visited [facility]. It is possible you will see health information before a provider has talked with you about it. This kind of information can be easy to misunderstand. To help you fully understand what it means for your health, we urge you to discuss this note with your provider.     Court Ponce PA-C  05/08/25 0142

## 2025-05-08 NOTE — CONSULTS
[facility]  Cardiology Consult Note    Patient Identification:  Name: Aye Villegas  Age: 35 y.o.  Sex: female  :  1990  MRN: [id number]             Requesting Physician: VINOD Huggins    Reason for Consultation / Chief Complaint:  chest pain    History of Present Illness:   Aye Villegas is a 35 year old who has follow [facility] cardiology in the past, last seen in . She has a history of palpitations, anxiety, asthma depression, ANGUS, and GERD. She presented to [facility] with dizziness, lightheaded, feeling like she was going to pass out chronic nonproductive cough, and chest discomfort. She states she has been dizzy, and lightheaded since she had COVID in February. Medications given in ER were asa, and saline bolus. She comes in consult for  chest pressure. No prior testing noted in the computer. Vital signs on admission were 109/57, resp. 20, HR 75, and temp 98.1 F.      Past Medical History:  Past Medical History:   Diagnosis Date    Anxiety     Asthma     Depression     GERD (gastroesophageal reflux disease)     Hidradenitis      Past Surgical History:  History reviewed. No pertinent surgical history.   Allergies:  Allergies   Allergen Reactions    Lorazepam Anxiety     pt states makes me feel more depressedstates emotional reaction    pt states it makes her more depressed feeling    Tilactase Swelling    Zolpidem Unknown - High Severity and Headache     Makes me extremely depressed.     Outside Source Comment: Makes me extremely depressed.    Makes me extremely depressed.    Bee Venom Unknown - High Severity    Doxycycline Unknown - High Severity     pt states she told her doctor she did not want to take, that her mother won't take due to pseudotumors and he listed it as an allergystates emotional reaction    Lactose Other (See Comments) and Unknown (See Comments)    Prednisone Anxiety     Home Meds:  Medications Prior to Admission   Medication Sig Dispense Refill Last Dose/Taking     "ondansetron (ZOFRAN) 4 MG tablet TAKE 1 TABLET BY MOUTH AS NEEDED FOR NAUSEA AND VOMITING UPTO FOUR TIMES DAILY   Taking    sucralfate (CARAFATE) 1 g tablet DISSOLVE 1 TABLET IN A FEW OUNCES OF LIQUID OF CHOICE, CAN TAKE UP TO FOUR TIMES DAILY AS NEEDED FOR REFLUX AND DYSPEPSIA   Taking    albuterol (PROAIR RESPICLICK) 108 (90 Base) MCG/ACT inhaler 2 Puff, Inhalation, PRN as needed for SOB, 0 Refill(s)       B-D 3CC LUER-ART SYR 25GX1\" 25G X 1\" 3 ML misc USE TO INJECT VITAMIN BEFORE-12 ONCE A MONTH       betamethasone dipropionate (DIPROSONE) 0.05 % cream Apply 1 Application topically to the appropriate area as directed 2 (Two) Times a Day. 30 g 0     lidocaine (LIDODERM) 5 % Place 1 patch on the skin as directed by provider Daily.       Trelegy Ellipta 100-62.5-25 MCG/ACT inhaler Inhale 1 puff Daily.        Current Meds:   [unfilled]  Social History:   Social History     Tobacco Use    Smoking status: Never    Smokeless tobacco: Never   Substance Use Topics    Alcohol use: Never      Family History:  History reviewed. No pertinent family history.     Review of Systems  Constitutional: No wt loss, fever   Gastrointestinal: No nausea , abdominal pain  Behavioral/Psych: No insomnia or anxiety   Cardiovascular ----positive for dyspnea. All other systems reviewed and are negative         /74 (BP Location: Left arm, Patient Position: Standing)   Pulse 74   Temp 97.9 °F (36.6 °C) (Oral)   Resp 16   Ht 162.6 cm (64\")   Wt 113 kg (250 lb)   LMP 04/01/2025   SpO2 98%   BMI 42.91 kg/m²   General appearance: No acute changes   Neck: Trachea midline; NECK, supple, no thyromegaly or lymphadenopathy   Lungs: Normal size and shape, normal breath sounds, equal distribution of air, no rales and rhonchi   CV: S1-S2 regular, no murmurs, no rub, no gallop   Abdomen: Soft, nontender; no masses , no abnormal abdominal sounds   Extremities: No deformity , normal color , no peripheral edema   Skin: Normal temperature, turgor and " texture; no rash, ulcers      Cardiographics  ECG:     Telemetry:    Echocardiogram:     Imaging  Chest X-ray:     Lab Review   Results from last 7 days   Lab Units 05/08/25  0324 05/08/25  0103 05/07/25  2349   HSTROP T ng/L <6 <6 <6         Results from last 7 days   Lab Units 05/08/25  0324   SODIUM mmol/L 140   POTASSIUM mmol/L 3.8   BUN mg/dL 10   CREATININE mg/dL 0.62   CALCIUM mg/dL 8.7     @[id number]@  Results from last 7 days   Lab Units 05/08/25  0324 05/08/25  0103   WBC 10*3/mm3 7.23 7.44   HEMOGLOBIN g/dL 13.5 13.2   HEMATOCRIT % 41.1 40.4   PLATELETS 10*3/mm3 219 218             Assessment:  Dizziness lightheadedness    Recommendations / Plan:   35 years old female admitted with dizziness and lightheadedness  Appears to be noncardiac  Patient will need ETT, echocardiogram monitor as well as a tilt table test  Labs/tests ordered for       Katyh Marquez MD  5/8/2025, 08:25 EDT      EMR Dragon/Transcription:   Dictated utilizing Dragon dictation

## 2025-05-09 ENCOUNTER — TELEPHONE (OUTPATIENT)
Dept: PHYSICAL THERAPY | Facility: CLINIC | Age: 35
End: 2025-05-09
Payer: COMMERCIAL

## 2025-05-09 NOTE — CASE MANAGEMENT/SOCIAL WORK
Case Management Discharge Note      Final Note: Home via private vehicle         Selected Continued Care - Discharged on 5/8/2025 Admission date: 5/7/2025 - Discharge disposition: Home or Self Care      Destination    No services have been selected for the patient.                Durable Medical Equipment    No services have been selected for the patient.                Dialysis/Infusion    No services have been selected for the patient.                Home Medical Care    No services have been selected for the patient.                Therapy    No services have been selected for the patient.                Community Resources    No services have been selected for the patient.                Community & DME    No services have been selected for the patient.                    Transportation Services  Private: Car    Final Discharge Disposition Code: 01 - home or self-care

## 2025-05-09 NOTE — TELEPHONE ENCOUNTER
Hub staff attempted to follow warm transfer process and was unsuccessful     Caller: Aye Villegas    Relationship to patient: Self    Best call back number: [phone]     Patient is needing: NEEDS TO RESCHEDULE VESITBULAR EVAL

## 2025-05-29 LAB
CV ZIO BASELINE AVG BPM: 81 BPM
CV ZIO BASELINE BPM HIGH: 150 BPM
CV ZIO BASELINE BPM LOW: 47 BPM
CV ZIO DEVICE ANALYSIS TIME: NORMAL
CV ZIO ECT SVE COUNT: 191 EPISODES
CV ZIO ECT SVE CPLT COUNT: 0 EPISODES
CV ZIO ECT SVE CPLT FREQ: 0
CV ZIO ECT SVE FREQ: NORMAL
CV ZIO ECT SVE TPLT COUNT: 0 EPISODES
CV ZIO ECT SVE TPLT FREQ: 0
CV ZIO ECT VE COUNT: 0 EPISODES
CV ZIO ECT VE CPLT COUNT: 0 EPISODES
CV ZIO ECT VE CPLT FREQ: 0
CV ZIO ECT VE FREQ: 0
CV ZIO ECT VE TPLT COUNT: 0 EPISODES
CV ZIO ECT VE TPLT FREQ: 0
CV ZIO ECTOPIC SVE COUPLET RAW PERCENT: 0 %
CV ZIO ECTOPIC SVE ISOLATED PERCENT: 0.02 %
CV ZIO ECTOPIC SVE TRIPLET RAW PERCENT: 0 %
CV ZIO ECTOPIC VE COUPLET RAW PERCENT: 0 %
CV ZIO ECTOPIC VE ISOLATED PERCENT: 0 %
CV ZIO ECTOPIC VE TRIPLET RAW PERCENT: 0 %
CV ZIO ENROLLMENT END: NORMAL
CV ZIO ENROLLMENT START: NORMAL
CV ZIO PATIENT EVENTS DIARIES: 7
CV ZIO PATIENT EVENTS TRIGGERS: 38
CV ZIO PAUSE COUNT: 0
CV ZIO PRESCRIPTION STATUS: NORMAL
CV ZIO SVT COUNT: 0
CV ZIO TOTAL  ENROLLMENT PERIOD: NORMAL
CV ZIO VT COUNT: 0

## 2025-06-06 ENCOUNTER — TREATMENT (OUTPATIENT)
Dept: PHYSICAL THERAPY | Facility: CLINIC | Age: 35
End: 2025-06-06
Payer: COMMERCIAL

## 2025-06-06 DIAGNOSIS — R42 LIGHTHEADEDNESS: Primary | ICD-10-CM

## 2025-06-06 NOTE — PROGRESS NOTES
"    Physical Therapy Initial Evaluation and Plan of Care  Clinic Location [address] [facility] [address], [place] [id number]    Patient: Aye Villegas   : 1990  Diagnosis/ICD-10 Code:  Lightheadedness [R42]  Referring practitioner: Francy Nicholson PA-C  Date of Initial Visit: 2025  Today's Date: 2025  Patient seen for 1 session         Visit Diagnoses:    ICD-10-CM ICD-9-CM   1. Lightheadedness  R42 780.4         Subjective Questionnaire: DHI: 62%      Subjective Evaluation    History of Present Illness  Mechanism of injury: Per pt referral, pt states she has been having intermittent episodes of dizziness and lightheadedness. Pt initially described these episodes feeling like she is going to pass out.  Pt states when she takes a deep breath she feels lightheaded. Pt was seen in ER last month and was told she was dehydrated and given IV fluids.    \"2025: MRI brain normal.  Echo normal.  Stress test normal.  Orthostatics normal.\"    Today, pt reports she had a tilt test yesterday where she was told she passed out and that her BP was abnormal as well as her HR. Has hearing aid in R ear for 4-5 months. Suspects she has POTS. Pt reports she feels dysequilibrium and weakness. Had COVID in February and symptoms began after this. Reports episodes of vertigo and admits to room spinning sensation. Has also had instances where her vision goes out \"like a blink\" when her eyes are open.    Admits to ear ringing and fullness on R. Hearing loss since birth. Denies any falls lately but feels like she will fall when she has episodes of her dizziness, lightheadedness, and vertigo. Vertigo occurs with positional changes. Denies hx of concussions or TBI. Admits to hx of prior increased intraocular pressure of R eye but reports this was normal when re-tested 3 weeks ago. FMHx of intracranial hypertension, mother. Since having COVID, symptoms were worsening, but now this has plateau and symptoms happen " intermittently. Admits to brain fog as well where her brain slows down and she has trouble focusing her vision and reports blurry vision. Admits to having headaches sometimes but unsure if this is related to her symptoms.    Laying down improves symptoms as well as resting, napping, and eating a little salt when feeling symptomatic. Pt voices she has done a lot of research on her own and has tried many techniques to help. Walking in stores prolonged makes symptoms worse as well as being upright a lot.    Pt believes her symptoms are likely due to POTS, but would like to rule out other conditions today.      Patient Occupation: CNA and nursing student. Quality of life: good    Pain  No pain reported    Diagnostic Tests  MRI studies: normal    Patient Goals  Patient goal: improve dizziness           Objective          Active Range of Motion   Cervical/Thoracic Spine   Cervical    Flexion: WFL  Extension: WFL  Left lateral flexion: WFL  Right lateral flexion: WFL  Left rotation: WFL  Right rotation: WFL    Additional Active Range of Motion Details  Lightheadedness mildly reproduced returning from cervical flexion position.    Tests   Cervical     Left   Negative alar ligament integrity and transverse ligament.     Right   Negative alar ligament integrity and transverse ligament.     Functional Assessment     Comments  Visual Fields - WNL  Smooth pursuit - WNL  Saccadic motion - WNL    VOR (head shake/thrust) - WNL with head shakes, minor dizziness upon test cessation lasting a few seconds.  VOR cancellation - mild dizziness reproduced, not familiar.    Cervical Dizziness Differentiation Test - WNL    Minor dizziness with gaze stabilization performing head nods and head turns.    Modified VA test (-)    Davida Hallpike   L - WNL  R - WNL    Horizontal roll test supine (goggle assist)  L - NT  R - NT     SLS > 10 seconds bilaterally, WNL.    Pt completed maze on wall with laser pointer on head and displayed no signs of  cervical instability.    Symptom exacerbation with positional changes from supine to sitting upright.     General Comments     Cervical/Thoracic Comments  Instability observed with cervical spine when returning to neutral position from different cervical motions.           Assessment & Plan       Assessment  Impairments: abnormal coordination, activity intolerance, lacks appropriate home exercise program and safety issue   Functional limitations: standing   Assessment details: Pt is a 34 y/o female presenting to PT with symptoms of dizziness and lightheadedness. Pt was evaluated today for any vestibular and ocular deficits but testing was WNL. She did not demonstrate any balance deficits today as well. S/S appear to be most consisted with a form of orthostatic hypotension as positional changes were the primary cause of symptom exacerbation today.    Plan  Therapy options: will not be seen for skilled therapy services            Timed:         Manual Therapy:         mins  69372;     Therapeutic Exercise:         mins  74665;     Neuromuscular Jodi:        mins  65624;    Therapeutic Activity:          mins  61534;     Gait Training:           mins  31291;     Ultrasound:          mins  96416;    Ionto                                   mins   37623  Self Care                       10     mins   50053        Un-Timed:  Electrical Stimulation:         mins  33975 ( );  Dry Needling          mins self-pay  Traction          mins 92179  Low Eval     29     Mins  10358  Mod Eval          Mins  11449  High Eval                            Mins  14103  Canalith Repos         mins 28647        Timed Treatment:   10   mins   Total Treatment:     39   mins          PT: Ray Vieira PT     [place] License #: [id number]  Electronically signed by Ray Vieira PT, 06/06/25, 2:42 PM EDT    Certification Period: 6/6/2025 thru 9/3/2025  I certify that the therapy services are furnished while this patient is under my care.  The  services outlined above are required by this patient, and will be reviewed every 90 days.         Physician Signature:__________________________________________________    PHYSICIAN: Francy Nicholson PA-C  NPI: [id number]                                      DATE:      Please sign and return via fax to [email or web address] . Thank you, [facility].

## 2025-06-12 ENCOUNTER — TELEPHONE (OUTPATIENT)
Dept: CARDIOLOGY | Facility: CLINIC | Age: 35
End: 2025-06-12

## 2025-06-12 NOTE — TELEPHONE ENCOUNTER
Caller: Aye Villegas    Relationship to patient: Self    Best call back number: [phone]    Patient is needing: PT WANTS TO MAKE A HFU WITH DR. LANGLEY. PT HAD TILT TABLE TEST AT [facility] AND RESULTS. NO TIMEFRAME IN CHART FOR HUB TO SCHEDULE.

## 2025-07-07 ENCOUNTER — HOSPITAL ENCOUNTER (EMERGENCY)
Facility: HOSPITAL | Age: 35
Discharge: HOME | End: 2025-07-07
Attending: EMERGENCY MEDICINE | Admitting: EMERGENCY MEDICINE
Payer: COMMERCIAL

## 2025-07-07 ENCOUNTER — APPOINTMENT (OUTPATIENT)
Dept: GENERAL RADIOLOGY | Facility: HOSPITAL | Age: 35
End: 2025-07-07
Payer: COMMERCIAL

## 2025-07-07 VITALS
HEART RATE: 91 BPM | DIASTOLIC BLOOD PRESSURE: 70 MMHG | RESPIRATION RATE: 18 BRPM | SYSTOLIC BLOOD PRESSURE: 108 MMHG | BODY MASS INDEX: 42.68 KG/M2 | HEIGHT: 64 IN | OXYGEN SATURATION: 98 % | WEIGHT: 250 LBS | TEMPERATURE: 98.6 F

## 2025-07-07 DIAGNOSIS — G90.1 DYSAUTONOMIA: Primary | ICD-10-CM

## 2025-07-07 DIAGNOSIS — I10 HYPERTENSION, UNSPECIFIED TYPE: ICD-10-CM

## 2025-07-07 LAB
ALBUMIN SERPL-MCNC: 4 G/DL (ref 3.5–5.2)
ALBUMIN/GLOB SERPL: 1.4 G/DL
ALP SERPL-CCNC: 97 U/L (ref 39–117)
ALT SERPL W P-5'-P-CCNC: 14 U/L (ref 1–33)
ANION GAP SERPL CALCULATED.3IONS-SCNC: 10 MMOL/L (ref 5–15)
AST SERPL-CCNC: 22 U/L (ref 1–32)
BASOPHILS # BLD AUTO: 0.04 10*3/MM3 (ref 0–0.2)
BASOPHILS NFR BLD AUTO: 0.5 % (ref 0–1.5)
BILIRUB SERPL-MCNC: 0.2 MG/DL (ref 0–1.2)
BUN SERPL-MCNC: 15 MG/DL (ref 6–20)
BUN/CREAT SERPL: 18.8 (ref 7–25)
CALCIUM SPEC-SCNC: 9.4 MG/DL (ref 8.6–10.5)
CHLORIDE SERPL-SCNC: 103 MMOL/L (ref 98–107)
CO2 SERPL-SCNC: 24 MMOL/L (ref 22–29)
CREAT SERPL-MCNC: 0.8 MG/DL (ref 0.57–1)
DEPRECATED RDW RBC AUTO: 41.2 FL (ref 37–54)
EGFRCR SERPLBLD CKD-EPI 2021: 98.1 ML/MIN/1.73
EOSINOPHIL # BLD AUTO: 0.23 10*3/MM3 (ref 0–0.4)
EOSINOPHIL NFR BLD AUTO: 3.1 % (ref 0.3–6.2)
ERYTHROCYTE [DISTWIDTH] IN BLOOD BY AUTOMATED COUNT: 12.2 % (ref 12.3–15.4)
GLOBULIN UR ELPH-MCNC: 2.9 GM/DL
GLUCOSE SERPL-MCNC: 109 MG/DL (ref 65–99)
HCT VFR BLD AUTO: 40.3 % (ref 34–46.6)
HGB BLD-MCNC: 13.3 G/DL (ref 12–15.9)
HOLD SPECIMEN: NORMAL
HOLD SPECIMEN: NORMAL
IMM GRANULOCYTES # BLD AUTO: 0.01 10*3/MM3 (ref 0–0.05)
IMM GRANULOCYTES NFR BLD AUTO: 0.1 % (ref 0–0.5)
LYMPHOCYTES # BLD AUTO: 2.58 10*3/MM3 (ref 0.7–3.1)
LYMPHOCYTES NFR BLD AUTO: 35.1 % (ref 19.6–45.3)
MCH RBC QN AUTO: 30.4 PG (ref 26.6–33)
MCHC RBC AUTO-ENTMCNC: 33 G/DL (ref 31.5–35.7)
MCV RBC AUTO: 92.2 FL (ref 79–97)
MONOCYTES # BLD AUTO: 0.64 10*3/MM3 (ref 0.1–0.9)
MONOCYTES NFR BLD AUTO: 8.7 % (ref 5–12)
NEUTROPHILS NFR BLD AUTO: 3.86 10*3/MM3 (ref 1.7–7)
NEUTROPHILS NFR BLD AUTO: 52.5 % (ref 42.7–76)
NRBC BLD AUTO-RTO: 0 /100 WBC (ref 0–0.2)
PLATELET # BLD AUTO: 222 10*3/MM3 (ref 140–450)
PMV BLD AUTO: 11.2 FL (ref 6–12)
POTASSIUM SERPL-SCNC: 4 MMOL/L (ref 3.5–5.2)
PROT SERPL-MCNC: 6.9 G/DL (ref 6–8.5)
RBC # BLD AUTO: 4.37 10*6/MM3 (ref 3.77–5.28)
SODIUM SERPL-SCNC: 137 MMOL/L (ref 136–145)
TROPONIN T SERPL HS-MCNC: <6 NG/L
WBC NRBC COR # BLD AUTO: 7.36 10*3/MM3 (ref 3.4–10.8)
WHOLE BLOOD HOLD COAG: NORMAL
WHOLE BLOOD HOLD SPECIMEN: NORMAL

## 2025-07-07 PROCEDURE — 99283 EMERGENCY DEPT VISIT LOW MDM: CPT

## 2025-07-07 PROCEDURE — 93005 ELECTROCARDIOGRAM TRACING: CPT | Performed by: EMERGENCY MEDICINE

## 2025-07-07 PROCEDURE — 85025 COMPLETE CBC W/AUTO DIFF WBC: CPT

## 2025-07-07 PROCEDURE — 36415 COLL VENOUS BLD VENIPUNCTURE: CPT

## 2025-07-07 PROCEDURE — 84484 ASSAY OF TROPONIN QUANT: CPT

## 2025-07-07 PROCEDURE — 80053 COMPREHEN METABOLIC PANEL: CPT

## 2025-07-07 PROCEDURE — 93005 ELECTROCARDIOGRAM TRACING: CPT

## 2025-07-07 RX ORDER — SODIUM CHLORIDE 0.9 % (FLUSH) 0.9 %
10 SYRINGE (ML) INJECTION AS NEEDED
Status: DISCONTINUED | OUTPATIENT
Start: 2025-07-07 | End: 2025-07-08 | Stop reason: HOSPADM

## 2025-07-07 RX ORDER — ASPIRIN 325 MG
325 TABLET ORAL ONCE
Status: DISCONTINUED | OUTPATIENT
Start: 2025-07-07 | End: 2025-07-08 | Stop reason: HOSPADM

## 2025-07-08 LAB
QT INTERVAL: 370 MS
QTC INTERVAL: 436 MS

## 2025-07-08 NOTE — DISCHARGE INSTRUCTIONS
Please follow-up with your PCP.    Rest.  Increase fluid intake.     Although you are being discharged in the ED today, I encourage you to return for worsening symptoms.  Things can, and do, change such a treatment at home with medication may not be adequate.  Specifically I recommend returning for chest pain or discomfort, difficulty breathing, persistent vomiting or difficulty holding down liquids or medications, fever > 102.0 F,  or any other worsening or alarming symptoms.     Take meds as prescribed.     You have been evaluated in the emergency department for your presenting symptoms and deemed appropriate for discharge home.  Understand that your health care does not end here today.  It is important that your primary care physician be made aware of your visit.  I recommend calling your primary care provider in the next 48 hours to arrange follow-up care.  Your primary care provider needs to review your treatment and recovery to ensure that no further treatment is necessary.  Additional testing or procedures may be necessary as an outpatient at the discretion of your primary care provider.    I also recommend following up with your PCP for recheck of your blood pressure and treatment as needed.

## 2025-07-08 NOTE — ED PROVIDER NOTES
MD ATTESTATION NOTE  I supervised care provided by the APC. We have discussed this patient's history, physical exam, and treatment plan. I have reviewed the APC's note and I agree with the APC's findings and plan of care.   SHARED VISIT: This visit was performed by BOTH a physician and an APC. The substantive portion of the medical decision making was performed by this attesting physician who made or approved the management plan and takes responsibility for patient management. All studies in the APC note (if performed) were independently interpreted by me.   I have personally had a face to face encounter with the patient.     PCP: Ning Silva MD  Patient Care Team:  Ning Silva MD as PCP - General (Family Medicine)     Aye Villegas is a 35 y.o. female who presents to the ED c/o headache and labile blood pressures.  Patient was recently diagnosed with dysautonomia.  She had an episode earlier where she began having a headache and blurry vision and her blood pressure was elevated with systolic greater than 170.  Blood pressure then became normal.  Symptoms then resolved.    On exam:  General: NAD.  Head: NCAT.  ENT: nares patent, no scleral icterus  Neck: Supple, trachea midline.  Cardiac: regular rate and rhythm.  Lungs: normal effort, clear to auscultation bilaterally  Abdomen: Soft, nondistended, NTTP, no rebound tenderness, no guarding or rigidity.   MS: Moves all extremities well, no peripheral edema  Neuro: alert, MAEW, follows commands  Psych: calm, cooperative  Skin: Warm, dry.    Medical Decision Making:  After the initial H&P, I discussed pertinent information from history and physical exam with patient/family.  Discussed differential diagnosis.  Discussed plan for ED evaluation/work-up/treatment.  All questions answered.  Patient/family is agreeable with plan.    ED Course as of 07/09/25 1234   Mon Jul 07, 2025 2256 EKG interpreted by myself  Time: 2146  Rate: 84  Rhythm: NSR  No ST elevation or  depression  Normal intervals  Normal morphology [AB]   2314 Patient initially presented for hypertension .  Patient is hypertensive here, does not have any sensation of feeling flushed, blood work normal.  Patient to be discharged to follow-up with PCP.  Patient does have diagnosis of dysautonomia, she is to see specialist in [facility] [CV]      ED Course User Index  [AB] Michael Haynes MD  [CV] John Ross PA-C       Diagnosis  Final diagnoses:   Dysautonomia   Hypertension, unspecified type        Michael Haynes MD  07/09/25 1234

## 2025-07-08 NOTE — ED PROVIDER NOTES
EMERGENCY DEPARTMENT ENCOUNTER    Room Number:  03/03  Date of encounter:  7/7/2025  PCP: Ning Silva MD  Historian: Patient  Full history not obtainable due to: None    HPI:  Chief Complaint: Hypertension    Context: Aye Villegas is a 35 y.o. female with a PMH significant for anxiety, lightheadedness, depression, hidradenitis, GERD, dysautonomia who presents to the ED c/o hypertension.  Patient says that she was taking a bath when she began to feel flushed, checked her blood pressure and saw that it was 174/120.  Patient says that she then laid down and said that her blood pressure normalized.  Patient then sat back up and had her blood pressure had again went up to 160 systolic.  Patient said at the time she had a slight headache that was not worse than her usual headaches, denied any chest pain or shortness of breath.  Patient came to the emergency department today because she was nervous about this fluctuation in her blood pressure, says that she has recently been diagnosed with dysautonomia and is to see a specialist at [place] but has not yet been able to get into see them.  Denies any new changes in her vision, lightheadedness, dizziness      MEDICAL RECORD REVIEW:    Upon review of the medical record it appears the patient was evaluated 4/3/2025.  The patient had a normal hemoglobin A1c and lipid panel.    PAST MEDICAL HISTORY    Active Ambulatory Problems     Diagnosis Date Noted    Lightheadedness 05/08/2025     Resolved Ambulatory Problems     Diagnosis Date Noted    No Resolved Ambulatory Problems     Past Medical History:   Diagnosis Date    Anxiety     Asthma     Depression     GERD (gastroesophageal reflux disease)     Hidradenitis          PAST SURGICAL HISTORY  No past surgical history on file.      FAMILY HISTORY  No family history on file.      SOCIAL HISTORY  Social History     Socioeconomic History    Marital status: Single   Tobacco Use    Smoking status: Never    Smokeless tobacco:  Never   Vaping Use    Vaping status: Never Used   Substance and Sexual Activity    Alcohol use: Never    Drug use: Never    Sexual activity: Defer         ALLERGIES  Lorazepam, Tilactase, Zolpidem, Bee venom, Doxycycline, Lactose, and Prednisone        REVIEW OF SYSTEMS    All systems reviewed and marked as negative except as listed in HPI     PHYSICAL EXAM    I have reviewed the triage vital signs and nursing notes.    ED Triage Vitals   Temp Heart Rate Resp BP SpO2   07/07/25 2139 07/07/25 2139 07/07/25 2139 07/07/25 2226 07/07/25 2139   98.6 °F (37 °C) 91 18 107/70 98 %      Temp src Heart Rate Source Patient Position BP Location FiO2 (%)   -- -- -- -- --              Physical Exam  Constitutional:       General: She is not in acute distress.     Appearance: Normal appearance. She is not ill-appearing.   HENT:      Head: Normocephalic and atraumatic.      Nose: Nose normal.   Eyes:      Extraocular Movements: Extraocular movements intact.      Pupils: Pupils are equal, round, and reactive to light.   Cardiovascular:      Rate and Rhythm: Normal rate and regular rhythm.      Pulses: Normal pulses.      Heart sounds: Normal heart sounds.   Pulmonary:      Effort: Pulmonary effort is normal. No respiratory distress.      Breath sounds: Normal breath sounds.   Abdominal:      General: Abdomen is flat.      Palpations: Abdomen is soft.   Skin:     General: Skin is warm and dry.      Coloration: Skin is not jaundiced.   Neurological:      Mental Status: She is alert and oriented to person, place, and time.   Psychiatric:         Mood and Affect: Mood normal.         Behavior: Behavior normal.         Thought Content: Thought content normal.         Vital signs and nursing notes reviewed.            LAB RESULTS  Recent Results (from the past 24 hours)   ECG 12 Lead ED Triage Standing Order; Chest Pain    Collection Time: 07/07/25  9:46 PM   Result Value Ref Range    QT Interval 370 ms    QTC Interval 436 ms    Comprehensive Metabolic Panel    Collection Time: 07/07/25  9:58 PM    Specimen: Arm, Left; Blood   Result Value Ref Range    Glucose 109 (H) 65 - 99 mg/dL    BUN 15.0 6.0 - 20.0 mg/dL    Creatinine 0.80 0.57 - 1.00 mg/dL    Sodium 137 136 - 145 mmol/L    Potassium 4.0 3.5 - 5.2 mmol/L    Chloride 103 98 - 107 mmol/L    CO2 24.0 22.0 - 29.0 mmol/L    Calcium 9.4 8.6 - 10.5 mg/dL    Total Protein 6.9 6.0 - 8.5 g/dL    Albumin 4.0 3.5 - 5.2 g/dL    ALT (SGPT) 14 1 - 33 U/L    AST (SGOT) 22 1 - 32 U/L    Alkaline Phosphatase 97 39 - 117 U/L    Total Bilirubin 0.2 0.0 - 1.2 mg/dL    Globulin 2.9 gm/dL    A/G Ratio 1.4 g/dL    BUN/Creatinine Ratio 18.8 7.0 - 25.0    Anion Gap 10.0 5.0 - 15.0 mmol/L    eGFR 98.1 >60.0 mL/min/1.73   High Sensitivity Troponin T    Collection Time: 07/07/25  9:58 PM    Specimen: Arm, Left; Blood   Result Value Ref Range    HS Troponin T <6 <14 ng/L   Green Top (Gel)    Collection Time: 07/07/25  9:58 PM   Result Value Ref Range    Extra Tube Hold for add-ons.    Lavender Top    Collection Time: 07/07/25  9:58 PM   Result Value Ref Range    Extra Tube hold for add-on    Gold Top - SST    Collection Time: 07/07/25  9:58 PM   Result Value Ref Range    Extra Tube Hold for add-ons.    Light Blue Top    Collection Time: 07/07/25  9:58 PM   Result Value Ref Range    Extra Tube Hold for add-ons.    CBC Auto Differential    Collection Time: 07/07/25  9:58 PM    Specimen: Arm, Left; Blood   Result Value Ref Range    WBC 7.36 3.40 - 10.80 10*3/mm3    RBC 4.37 3.77 - 5.28 10*6/mm3    Hemoglobin 13.3 12.0 - 15.9 g/dL    Hematocrit 40.3 34.0 - 46.6 %    MCV 92.2 79.0 - 97.0 fL    MCH 30.4 26.6 - 33.0 pg    MCHC 33.0 31.5 - 35.7 g/dL    RDW 12.2 (L) 12.3 - 15.4 %    RDW-SD 41.2 37.0 - 54.0 fl    MPV 11.2 6.0 - 12.0 fL    Platelets 222 140 - 450 10*3/mm3    Neutrophil % 52.5 42.7 - 76.0 %    Lymphocyte % 35.1 19.6 - 45.3 %    Monocyte % 8.7 5.0 - 12.0 %    Eosinophil % 3.1 0.3 - 6.2 %    Basophil % 0.5  0.0 - 1.5 %    Immature Grans % 0.1 0.0 - 0.5 %    Neutrophils, Absolute 3.86 1.70 - 7.00 10*3/mm3    Lymphocytes, Absolute 2.58 0.70 - 3.10 10*3/mm3    Monocytes, Absolute 0.64 0.10 - 0.90 10*3/mm3    Eosinophils, Absolute 0.23 0.00 - 0.40 10*3/mm3    Basophils, Absolute 0.04 0.00 - 0.20 10*3/mm3    Immature Grans, Absolute 0.01 0.00 - 0.05 10*3/mm3    nRBC 0.0 0.0 - 0.2 /100 WBC       Ordered the above labs and independently reviewed the results.        RADIOLOGY  No Radiology Exams Resulted Within Past 24 Hours    I ordered the above noted radiological studies. Independently reviewed by me and discussed with radiologist.  See dictation above for official radiology interpretation.      PROCEDURES    Procedures        MEDICATIONS GIVEN IN ER    Medications   sodium chloride 0.9 % flush 10 mL (has no administration in time range)   aspirin tablet 325 mg (325 mg Oral Not Given 7/7/25 2229)         PROGRESS, DATA ANALYSIS, CONSULTS, AND MEDICAL DECISION MAKING    All labs have been independently interpreted by me.  All radiology studies have been interpreted by me.  Discussion below represents my analysis of pertinent findings related to patient's condition, differential diagnosis, treatment plan and final disposition.    Patient was hypertensive at home but was not in the emergency department today, no evidence of endorgan damage.  Not complaining of any chest pain, shortness of breath.  Does have a headache which she says pretty typical for herself, does not really has a severe headache, says it is mild compared to her other headaches.  At this time patient had many questions regarding her blood pressure management which were answered to the best of my ability.  Through shared decision making we decided patient was stable for discharge and follow-up with her PCP and her specialist at [place] we discussed return precautions in detail which the patient understood and agreed to    - Chronic or social conditions  impacting care: Anxiety      DIFFERENTIAL DIAGNOSIS INCLUDE BUT NOT LIMITED TO: Hypertension, dysautonomia, POTS      Orders placed during this visit:  Orders Placed This Encounter   Procedures    [place] Draw    Comprehensive Metabolic Panel    High Sensitivity Troponin T    CBC Auto Differential    NPO Diet NPO Type: Strict NPO    Undress & Gown    Continuous Pulse Oximetry    Oxygen Therapy- Nasal Cannula; Titrate 1-6 LPM Per SpO2; 90 - 95%    ECG 12 Lead ED Triage Standing Order; Chest Pain    ECG 12 Lead ED Triage Standing Order; Chest Pain    Insert Peripheral IV    CBC & Differential    Green Top (Gel)    Lavender Top    Gold Top - SST    Light Blue Top         ED Course as of 07/07/25 2322 Mon Jul 07, 2025   2256 EKG interpreted by myself  Time: 2146  Rate: 84  Rhythm: NSR  No ST elevation or depression  Normal intervals  Normal morphology [AB]   2314 Patient initially presented for hypertension .  Patient is hypertensive here, does not have any sensation of feeling flushed, blood work normal.  Patient to be discharged to follow-up with PCP.  Patient does have diagnosis of dysautonomia, she is to see specialist in [facility] [CV]      ED Course User Index  [AB] Michael Haynes MD  [CV] John Ross PA-C       AS OF 23:22 EDT VITALS:    BP - 107/70  HR - 91  TEMP - 98.6 °F (37 °C)  02 SATS - 98%    I rechecked the patient.  I discussed the patient's labs, radiology findings (including all incidental findings), diagnosis, and plan for discharge.  A repeat exam reveals no new worrisome changes from my initial exam findings.  The patient understands that the fact that they are being discharged does not denote that nothing is abnormal, it indicates that no clinical emergency is present and that they must follow-up as directed in order to properly maintain their health.  Follow-up instructions (specifically listed below) and return to ER precautions were given at this time.  I specifically  instructed the patient to follow-up with their PCP.  The patient understands and agrees with the plan, and is ready for discharge.  All questions answered.    Ning Silva MD  [address] [place] [address]  [place] [id number] [phone]    Go in 2 days  As needed         Medication List      No changes were made to your prescriptions during this visit.           DIAGNOSIS  Final diagnoses:   Dysautonomia   Hypertension, unspecified type         DISPOSITION  Discharge    Pt masked in first look. I wore a surgical mask throughout my encounters with the pt. I performed hand hygiene on entry into the pt room and upon exit.     Dictated utilizing Dragon dictation     Note Disclaimer: At [facility], we believe that sharing information builds trust and better relationships. You are receiving this note because you recently visited [facility]. It is possible you will see health information before a provider has talked with you about it. This kind of information can be easy to misunderstand. To help you fully understand what it means for your health, we urge you to discuss this note with your provider.      John Ross PA-C  07/08/25 0012

## 2025-08-08 RX ORDER — PANTOPRAZOLE SODIUM 40 MG/1
1 TABLET, DELAYED RELEASE ORAL DAILY
COMMUNITY
Start: 2025-07-03

## 2025-08-08 RX ORDER — BUSPIRONE HYDROCHLORIDE 5 MG/1
5 TABLET ORAL DAILY
COMMUNITY
Start: 2025-05-07 | End: 2025-08-11 | Stop reason: ALTCHOICE

## 2025-08-08 RX ORDER — MONTELUKAST SODIUM 10 MG/1
TABLET ORAL
COMMUNITY
Start: 2025-05-07

## 2025-08-08 RX ORDER — MECLIZINE HCL 12.5 MG 12.5 MG/1
12.5 TABLET ORAL
COMMUNITY
Start: 2025-04-23 | End: 2025-08-11 | Stop reason: ALTCHOICE

## 2025-08-08 RX ORDER — LEVOCETIRIZINE DIHYDROCHLORIDE 5 MG/1
5 TABLET, FILM COATED ORAL
COMMUNITY
End: 2025-08-11 | Stop reason: ALTCHOICE

## 2025-08-11 ENCOUNTER — OFFICE VISIT (OUTPATIENT)
Dept: CARDIOLOGY | Facility: CLINIC | Age: 35
End: 2025-08-11
Payer: COMMERCIAL

## 2025-08-11 VITALS
WEIGHT: 245 LBS | HEIGHT: 64 IN | DIASTOLIC BLOOD PRESSURE: 89 MMHG | BODY MASS INDEX: 41.83 KG/M2 | HEART RATE: 79 BPM | SYSTOLIC BLOOD PRESSURE: 126 MMHG

## 2025-08-11 DIAGNOSIS — G90.A POTS (POSTURAL ORTHOSTATIC TACHYCARDIA SYNDROME): Primary | ICD-10-CM

## 2025-08-11 PROCEDURE — 99213 OFFICE O/P EST LOW 20 MIN: CPT

## 2025-08-11 RX ORDER — ONDANSETRON 4 MG/1
TABLET, ORALLY DISINTEGRATING ORAL
COMMUNITY

## 2025-08-11 RX ORDER — IBUPROFEN 400 MG/1
400 TABLET, FILM COATED ORAL EVERY 6 HOURS PRN
COMMUNITY

## 2025-08-11 RX ORDER — LEVALBUTEROL TARTRATE 45 UG/1
1-2 AEROSOL, METERED ORAL EVERY 6 HOURS PRN
COMMUNITY
Start: 2025-07-07 | End: 2025-08-11 | Stop reason: ALTCHOICE

## 2025-08-11 RX ORDER — IPRATROPIUM BROMIDE 17 UG/1
2 AEROSOL, METERED RESPIRATORY (INHALATION) 2 TIMES DAILY
COMMUNITY
Start: 2025-07-07

## 2025-08-11 RX ORDER — PROMETHAZINE HYDROCHLORIDE 25 MG/1
25 TABLET ORAL
COMMUNITY
Start: 2025-06-05